# Patient Record
Sex: FEMALE | Race: WHITE | Employment: FULL TIME | URBAN - NONMETROPOLITAN AREA
[De-identification: names, ages, dates, MRNs, and addresses within clinical notes are randomized per-mention and may not be internally consistent; named-entity substitution may affect disease eponyms.]

---

## 2017-03-31 ENCOUNTER — LAB ENCOUNTER (OUTPATIENT)
Dept: LAB | Age: 52
End: 2017-03-31
Attending: FAMILY MEDICINE
Payer: COMMERCIAL

## 2017-03-31 ENCOUNTER — OFFICE VISIT (OUTPATIENT)
Dept: FAMILY MEDICINE CLINIC | Facility: CLINIC | Age: 52
End: 2017-03-31

## 2017-03-31 VITALS
DIASTOLIC BLOOD PRESSURE: 66 MMHG | HEART RATE: 91 BPM | BODY MASS INDEX: 21.53 KG/M2 | TEMPERATURE: 98 F | SYSTOLIC BLOOD PRESSURE: 106 MMHG | HEIGHT: 69 IN | OXYGEN SATURATION: 96 % | WEIGHT: 145.38 LBS

## 2017-03-31 DIAGNOSIS — R53.83 FATIGUE, UNSPECIFIED TYPE: ICD-10-CM

## 2017-03-31 DIAGNOSIS — R53.83 FATIGUE, UNSPECIFIED TYPE: Primary | ICD-10-CM

## 2017-03-31 DIAGNOSIS — F43.29 STRESS AND ADJUSTMENT REACTION: ICD-10-CM

## 2017-03-31 DIAGNOSIS — C50.919 METASTASIS FROM BREAST CANCER (HCC): ICD-10-CM

## 2017-03-31 DIAGNOSIS — Z00.00 ROUTINE HEALTH MAINTENANCE: ICD-10-CM

## 2017-03-31 DIAGNOSIS — C79.9 METASTASIS FROM BREAST CANCER (HCC): ICD-10-CM

## 2017-03-31 PROCEDURE — 36415 COLL VENOUS BLD VENIPUNCTURE: CPT

## 2017-03-31 PROCEDURE — 85025 COMPLETE CBC W/AUTO DIFF WBC: CPT

## 2017-03-31 PROCEDURE — 80053 COMPREHEN METABOLIC PANEL: CPT

## 2017-03-31 PROCEDURE — 99214 OFFICE O/P EST MOD 30 MIN: CPT | Performed by: FAMILY MEDICINE

## 2017-03-31 PROCEDURE — 80061 LIPID PANEL: CPT

## 2017-03-31 PROCEDURE — 84439 ASSAY OF FREE THYROXINE: CPT

## 2017-03-31 PROCEDURE — 84443 ASSAY THYROID STIM HORMONE: CPT

## 2017-03-31 NOTE — PROGRESS NOTES
HPI:    Patient ID: Kana Gerardo is a 46year old female. Pt c/o fatigue  Sleep ok  + stress  Concern thyroid  Wt stable  HPI    Review of Systems   Constitutional: Positive for fatigue. Negative for fever and chills. HENT: Negative.     Respiratory maintenance      Orders Placed This Encounter  TSH and Free T4 [E]  CBC W Differential W Platelet [E]  Comp Metabolic Panel (14) [E]  Lipid Panel [E]    Meds This Visit:  No prescriptions requested or ordered in this encounter    Imaging & Referrals:  None

## 2017-08-18 ENCOUNTER — PATIENT OUTREACH (OUTPATIENT)
Dept: FAMILY MEDICINE CLINIC | Facility: CLINIC | Age: 52
End: 2017-08-18

## 2017-11-03 ENCOUNTER — OFFICE VISIT (OUTPATIENT)
Dept: FAMILY MEDICINE CLINIC | Facility: CLINIC | Age: 52
End: 2017-11-03

## 2017-11-03 VITALS
BODY MASS INDEX: 21 KG/M2 | TEMPERATURE: 98 F | DIASTOLIC BLOOD PRESSURE: 70 MMHG | WEIGHT: 143.13 LBS | SYSTOLIC BLOOD PRESSURE: 130 MMHG

## 2017-11-03 DIAGNOSIS — S63.502A SPRAIN OF LEFT WRIST, INITIAL ENCOUNTER: Primary | ICD-10-CM

## 2017-11-03 DIAGNOSIS — M25.532 LEFT WRIST PAIN: ICD-10-CM

## 2017-11-03 PROCEDURE — 99214 OFFICE O/P EST MOD 30 MIN: CPT | Performed by: FAMILY MEDICINE

## 2017-11-03 PROCEDURE — L3908 WHO COCK-UP NONMOLDE PRE OTS: HCPCS | Performed by: FAMILY MEDICINE

## 2017-11-03 NOTE — PROGRESS NOTES
HPI:    Patient ID: Lizbeth Flores is a 45year old female. X 2 wks  Fell back catching on L wrist  Pain wrist / forearm  Using OK  Motion OK  ?  swelling  HPI    Review of Systems           Current Outpatient Prescriptions:  exemestane (AROMASIN) 25 MG

## 2017-11-03 NOTE — PATIENT INSTRUCTIONS
Wrist immobilizer. Alternate ice and heat. Massage. Reevaluation in 2 weeks. Call with questions or problems.

## 2018-09-14 ENCOUNTER — TELEPHONE (OUTPATIENT)
Dept: FAMILY MEDICINE CLINIC | Facility: CLINIC | Age: 53
End: 2018-09-14

## 2018-09-14 ENCOUNTER — NURSE ONLY (OUTPATIENT)
Dept: FAMILY MEDICINE CLINIC | Facility: CLINIC | Age: 53
End: 2018-09-14
Payer: COMMERCIAL

## 2018-09-14 DIAGNOSIS — R30.0 DYSURIA: Primary | ICD-10-CM

## 2018-09-14 LAB
APPEARANCE: CLEAR
MULTISTIX LOT#: NORMAL NUMERIC
PH, URINE: 6 (ref 4.5–8)
SPECIFIC GRAVITY: 1 (ref 1–1.03)
UROBILINOGEN,SEMI-QN: 0.2 MG/DL (ref 0–1.9)

## 2018-09-14 PROCEDURE — 87086 URINE CULTURE/COLONY COUNT: CPT | Performed by: FAMILY MEDICINE

## 2018-09-14 PROCEDURE — 81003 URINALYSIS AUTO W/O SCOPE: CPT | Performed by: FAMILY MEDICINE

## 2018-09-14 RX ORDER — CEPHALEXIN 500 MG/1
500 CAPSULE ORAL 3 TIMES DAILY
Qty: 15 CAPSULE | Refills: 0 | Status: SHIPPED | OUTPATIENT
Start: 2018-09-14 | End: 2018-09-19

## 2018-09-14 NOTE — TELEPHONE ENCOUNTER
Dull ache in back and side. Some burning with urination. Pt states she is fatigued. Pt denies fevers.  Pt is wanting to be seen today or can she bring in a urine sample? erik

## 2018-09-17 ENCOUNTER — TELEPHONE (OUTPATIENT)
Dept: FAMILY MEDICINE CLINIC | Facility: CLINIC | Age: 53
End: 2018-09-17

## 2018-09-17 NOTE — PROGRESS NOTES
HPI:   Lydia Herring is a 52year old female who is here for complaints of back pain. Pain is located at low back. Pain is described as dull, aching. Severity is mild. The pain radiates to no radiation of pain. Pain was precipitated by unknown.  Has ha IMMEDIATE/EXPANDER; Bilateral      Comment:  Performed by Roxanne Parks MD at [facility] MAIN OR  4/16/2014: [address];  Bilateral      Comment:  Performed by Akosua Correa MD at [address]  3/26/2015: EXCISION OF AXILLA MASS; Righ thoracic tenderness    ASSESSMENT:   1. Acute bilateral low back pain without sciatica  - back stretches  - states is much better  - if worsens to return for xray  - SED RATE, MELISSA (AUTOMATED); Future    2.  Fatigue, unspecified type  - CBC WITH DIFFE

## 2018-09-18 ENCOUNTER — OFFICE VISIT (OUTPATIENT)
Dept: FAMILY MEDICINE CLINIC | Facility: CLINIC | Age: 53
End: 2018-09-18

## 2018-09-18 ENCOUNTER — LAB ENCOUNTER (OUTPATIENT)
Dept: LAB | Age: 53
End: 2018-09-18
Attending: FAMILY MEDICINE
Payer: COMMERCIAL

## 2018-09-18 VITALS
SYSTOLIC BLOOD PRESSURE: 110 MMHG | HEART RATE: 85 BPM | BODY MASS INDEX: 23.5 KG/M2 | OXYGEN SATURATION: 96 % | TEMPERATURE: 98 F | HEIGHT: 67.5 IN | DIASTOLIC BLOOD PRESSURE: 80 MMHG | WEIGHT: 151.5 LBS

## 2018-09-18 DIAGNOSIS — R53.83 FATIGUE, UNSPECIFIED TYPE: ICD-10-CM

## 2018-09-18 DIAGNOSIS — C50.919 METASTATIC BREAST CANCER (HCC): ICD-10-CM

## 2018-09-18 DIAGNOSIS — M54.50 ACUTE BILATERAL LOW BACK PAIN WITHOUT SCIATICA: Primary | ICD-10-CM

## 2018-09-18 DIAGNOSIS — M54.50 ACUTE BILATERAL LOW BACK PAIN WITHOUT SCIATICA: ICD-10-CM

## 2018-09-18 DIAGNOSIS — R39.15 URGENCY OF URINATION: ICD-10-CM

## 2018-09-18 DIAGNOSIS — N95.2 ATROPHIC VAGINITIS: ICD-10-CM

## 2018-09-18 LAB
ALBUMIN SERPL-MCNC: 3.9 G/DL (ref 3.5–4.8)
ALBUMIN/GLOB SERPL: 1.1 {RATIO} (ref 1–2)
ALP LIVER SERPL-CCNC: 55 U/L (ref 41–108)
ALT SERPL-CCNC: 29 U/L (ref 14–54)
ANION GAP SERPL CALC-SCNC: 7 MMOL/L (ref 0–18)
AST SERPL-CCNC: 20 U/L (ref 15–41)
BASOPHILS # BLD AUTO: 0.03 X10(3) UL (ref 0–0.1)
BASOPHILS NFR BLD AUTO: 0.7 %
BILIRUB SERPL-MCNC: 0.8 MG/DL (ref 0.1–2)
BUN BLD-MCNC: 19 MG/DL (ref 8–20)
BUN/CREAT SERPL: 27.1 (ref 10–20)
CALCIUM BLD-MCNC: 9.4 MG/DL (ref 8.3–10.3)
CHLORIDE SERPL-SCNC: 106 MMOL/L (ref 101–111)
CO2 SERPL-SCNC: 26 MMOL/L (ref 22–32)
CREAT BLD-MCNC: 0.7 MG/DL (ref 0.55–1.02)
EOSINOPHIL # BLD AUTO: 0.13 X10(3) UL (ref 0–0.3)
EOSINOPHIL NFR BLD AUTO: 3 %
ERYTHROCYTE [DISTWIDTH] IN BLOOD BY AUTOMATED COUNT: 12.8 % (ref 11.5–16)
GLOBULIN PLAS-MCNC: 3.5 G/DL (ref 2.5–4)
GLUCOSE BLD-MCNC: 78 MG/DL (ref 70–99)
HCT VFR BLD AUTO: 42.6 % (ref 34–50)
HGB BLD-MCNC: 13.4 G/DL (ref 12–16)
IMMATURE GRANULOCYTE COUNT: 0.01 X10(3) UL (ref 0–1)
IMMATURE GRANULOCYTE RATIO %: 0.2 %
LYMPHOCYTES # BLD AUTO: 1.3 X10(3) UL (ref 0.9–4)
LYMPHOCYTES NFR BLD AUTO: 29.6 %
M PROTEIN MFR SERPL ELPH: 7.4 G/DL (ref 6.1–8.3)
MCH RBC QN AUTO: 27.7 PG (ref 27–33.2)
MCHC RBC AUTO-ENTMCNC: 31.5 G/DL (ref 31–37)
MCV RBC AUTO: 88.2 FL (ref 81–100)
MONOCYTES # BLD AUTO: 0.37 X10(3) UL (ref 0.1–1)
MONOCYTES NFR BLD AUTO: 8.4 %
NEUTROPHIL ABS PRELIM: 2.55 X10 (3) UL (ref 1.3–6.7)
NEUTROPHILS # BLD AUTO: 2.55 X10(3) UL (ref 1.3–6.7)
NEUTROPHILS NFR BLD AUTO: 58.1 %
OSMOLALITY SERPL CALC.SUM OF ELEC: 289 MOSM/KG (ref 275–295)
PLATELET # BLD AUTO: 186 10(3)UL (ref 150–450)
POTASSIUM SERPL-SCNC: 4.5 MMOL/L (ref 3.6–5.1)
RBC # BLD AUTO: 4.83 X10(6)UL (ref 3.8–5.1)
RED CELL DISTRIBUTION WIDTH-SD: 41.2 FL (ref 35.1–46.3)
SED RATE-ML: 8 MM/HR (ref 0–25)
SODIUM SERPL-SCNC: 139 MMOL/L (ref 136–144)
T4 FREE SERPL-MCNC: 1 NG/DL (ref 0.9–1.8)
TSI SER-ACNC: 1.57 MIU/ML (ref 0.35–5.5)
WBC # BLD AUTO: 4.4 X10(3) UL (ref 4–13)

## 2018-09-18 PROCEDURE — 80050 GENERAL HEALTH PANEL: CPT | Performed by: FAMILY MEDICINE

## 2018-09-18 PROCEDURE — 85652 RBC SED RATE AUTOMATED: CPT | Performed by: FAMILY MEDICINE

## 2018-09-18 PROCEDURE — 99214 OFFICE O/P EST MOD 30 MIN: CPT | Performed by: FAMILY MEDICINE

## 2018-09-18 PROCEDURE — 36415 COLL VENOUS BLD VENIPUNCTURE: CPT | Performed by: FAMILY MEDICINE

## 2018-09-18 PROCEDURE — 84439 ASSAY OF FREE THYROXINE: CPT | Performed by: FAMILY MEDICINE

## 2018-09-18 RX ORDER — CLOBETASOL PROPIONATE 0.5 MG/G
1 OINTMENT TOPICAL 2 TIMES DAILY
Qty: 15 G | Refills: 0 | Status: SHIPPED | OUTPATIENT
Start: 2018-09-18

## 2019-01-17 ENCOUNTER — HOSPITAL ENCOUNTER (OUTPATIENT)
Age: 54
Discharge: HOME | End: 2019-01-17
Payer: COMMERCIAL

## 2019-01-17 ENCOUNTER — APPOINTMENT (OUTPATIENT)
Dept: GENERAL RADIOLOGY | Age: 54
End: 2019-01-17
Attending: PHYSICIAN ASSISTANT
Payer: COMMERCIAL

## 2019-01-17 VITALS
OXYGEN SATURATION: 98 % | HEIGHT: 67 IN | HEART RATE: 82 BPM | DIASTOLIC BLOOD PRESSURE: 70 MMHG | BODY MASS INDEX: 22.76 KG/M2 | WEIGHT: 145 LBS | RESPIRATION RATE: 16 BRPM | TEMPERATURE: 98 F | SYSTOLIC BLOOD PRESSURE: 124 MMHG

## 2019-01-17 DIAGNOSIS — S73.101A SPRAIN OF RIGHT HIP, INITIAL ENCOUNTER: ICD-10-CM

## 2019-01-17 DIAGNOSIS — S76.301A RIGHT HAMSTRING INJURY, INITIAL ENCOUNTER: Primary | ICD-10-CM

## 2019-01-17 PROCEDURE — 73502 X-RAY EXAM HIP UNI 2-3 VIEWS: CPT | Performed by: PHYSICIAN ASSISTANT

## 2019-01-17 PROCEDURE — 99204 OFFICE O/P NEW MOD 45 MIN: CPT

## 2019-01-17 PROCEDURE — 99213 OFFICE O/P EST LOW 20 MIN: CPT

## 2019-01-17 RX ORDER — NAPROXEN 500 MG/1
500 TABLET ORAL 2 TIMES DAILY PRN
Qty: 20 TABLET | Refills: 0 | Status: SHIPPED | OUTPATIENT
Start: 2019-01-17 | End: 2019-01-24

## 2019-01-17 RX ORDER — CYCLOBENZAPRINE HCL 10 MG
10 TABLET ORAL 3 TIMES DAILY PRN
Qty: 20 TABLET | Refills: 0 | Status: SHIPPED | OUTPATIENT
Start: 2019-01-17 | End: 2019-01-24

## 2019-10-12 ENCOUNTER — OFFICE VISIT (OUTPATIENT)
Dept: FAMILY MEDICINE CLINIC | Facility: CLINIC | Age: 54
End: 2019-10-12
Payer: COMMERCIAL

## 2019-10-12 VITALS
RESPIRATION RATE: 18 BRPM | DIASTOLIC BLOOD PRESSURE: 80 MMHG | SYSTOLIC BLOOD PRESSURE: 122 MMHG | TEMPERATURE: 98 F | WEIGHT: 145 LBS | HEART RATE: 74 BPM | HEIGHT: 67 IN | OXYGEN SATURATION: 97 % | BODY MASS INDEX: 22.76 KG/M2

## 2019-10-12 DIAGNOSIS — H10.9 CONJUNCTIVITIS OF BOTH EYES, UNSPECIFIED CONJUNCTIVITIS TYPE: Primary | ICD-10-CM

## 2019-10-12 PROCEDURE — 99213 OFFICE O/P EST LOW 20 MIN: CPT | Performed by: PHYSICIAN ASSISTANT

## 2019-10-12 RX ORDER — GENTAMICIN SULFATE 3 MG/ML
SOLUTION/ DROPS OPHTHALMIC
Qty: 5 ML | Refills: 1 | Status: SHIPPED | OUTPATIENT
Start: 2019-10-12 | End: 2019-10-19

## 2019-10-12 NOTE — PROGRESS NOTES
CHIEF COMPLAINT:   Patient presents with:  Conjunctivitis: luis e eye pain , vision blurry, redness and itchy      HPI:   Ileana Ervin is a 62year old female who presents with chief complaint of  luis e eye irritation x 2 days.   Some thick d/c, no matterin • EXCISION OF AXILLA MASS Right 3/26/2015    Performed by Pebbles Christian MD at [address] [place] LEFT  04/16/14   • MASTECTOMY RIGHT  04/16/14   • OOPHORECTOMY     • ORTHOPEDIC SURG (PBP)      tendon transplant right knee   • TONSILLECTOM HENT: atraumatic, normocephalic, TMs non injected, without bulging or fluid bilaterally. Nasal mucosa pink and non inflamed. Posterior pharynx pink without lesions. NECK: supple, non tender  LUNGS: clear to auscultation bilaterally.    CARDIO: RRR without A cold, flu, or other virus can spread to your eyes. This causes a watery discharge. Your eyes may burn or itch and get red. Your eyelids may also be puffy and sore. Treatment  Most viral infections go away on their own.  Artificial tears and warm compress

## 2019-10-12 NOTE — PATIENT INSTRUCTIONS
1.  Gentamicin drops as prescribed. 2.  May use rewetting drops in between for symptoms relief. 3.  If symptoms fail to improve as anticipated, follow up with your eye doctor.        Conjunctivitis Caused by Infection     Wash hands often to help preven bedding or towels. Use a new, clean washcloth every day. Throw away eye cosmetics, especially mascara. Never use someone else's eye cosmetics.  If you use contact lenses, follow your healthcare provider's instructions on proper lens care.   Date Last Review

## 2020-02-08 NOTE — ED PROVIDER NOTES
Patient Seen in: [facility]    History   Patient presents with:  Hip Pain    Stated Complaint: Ongoing Hip Pain    HPI    Patient is a very pleasant 40-year-old female with a medical history of breast cancer, functional heart murmur MASTECTOMY LEFT  04/16/14   • MASTECTOMY RIGHT  04/16/14   • OOPHORECTOMY     • ORTHOPEDIC SURG (PBP)      tendon transplant right knee   • TONSILLECTOMY         Family history reviewed and is not pertinent to presenting problem.     Social History    Tobac Normal Gait. Extremity strength is 5/5 and equal bilaterally. Sensation is equal bilaterally.     ED Course   Labs Reviewed - No data to display       Xr Hip W Or Wo Pelvis 2 Or 3 Views, Right (cpt=73502)    Result Date: 1/17/2019  PROCEDURE:  XR HIP W OR W encounter    Disposition:  Discharge  1/17/2019  6:54 pm    Follow-up:  Bianca Christianson, [address] [address] [phone]              Medications Prescribed:  Current Discharge Medication List    START taking these medications no

## 2020-10-23 ENCOUNTER — OFFICE VISIT (OUTPATIENT)
Dept: FAMILY MEDICINE CLINIC | Facility: CLINIC | Age: 55
End: 2020-10-23
Payer: COMMERCIAL

## 2020-10-23 VITALS
SYSTOLIC BLOOD PRESSURE: 112 MMHG | HEART RATE: 80 BPM | TEMPERATURE: 99 F | RESPIRATION RATE: 16 BRPM | HEIGHT: 67.25 IN | BODY MASS INDEX: 24.88 KG/M2 | WEIGHT: 160.38 LBS | DIASTOLIC BLOOD PRESSURE: 64 MMHG

## 2020-10-23 DIAGNOSIS — T85.49XA DEFLATION OF BREAST IMPLANT, INITIAL ENCOUNTER: ICD-10-CM

## 2020-10-23 DIAGNOSIS — H61.102 LESION OF LEFT PINNA: ICD-10-CM

## 2020-10-23 DIAGNOSIS — Z85.3 HX: BREAST CANCER: ICD-10-CM

## 2020-10-23 DIAGNOSIS — Z01.419 WELL WOMAN EXAM WITH ROUTINE GYNECOLOGICAL EXAM: Primary | ICD-10-CM

## 2020-10-23 DIAGNOSIS — Z23 NEED FOR VACCINATION: ICD-10-CM

## 2020-10-23 PROCEDURE — 88175 CYTOPATH C/V AUTO FLUID REDO: CPT | Performed by: FAMILY MEDICINE

## 2020-10-23 PROCEDURE — 80050 GENERAL HEALTH PANEL: CPT | Performed by: FAMILY MEDICINE

## 2020-10-23 PROCEDURE — 90670 PCV13 VACCINE IM: CPT | Performed by: FAMILY MEDICINE

## 2020-10-23 PROCEDURE — 3008F BODY MASS INDEX DOCD: CPT | Performed by: FAMILY MEDICINE

## 2020-10-23 PROCEDURE — 90471 IMMUNIZATION ADMIN: CPT | Performed by: FAMILY MEDICINE

## 2020-10-23 PROCEDURE — 36415 COLL VENOUS BLD VENIPUNCTURE: CPT | Performed by: FAMILY MEDICINE

## 2020-10-23 PROCEDURE — 80061 LIPID PANEL: CPT | Performed by: FAMILY MEDICINE

## 2020-10-23 PROCEDURE — 17110 DESTRUCTION B9 LES UP TO 14: CPT | Performed by: FAMILY MEDICINE

## 2020-10-23 PROCEDURE — 90472 IMMUNIZATION ADMIN EACH ADD: CPT | Performed by: FAMILY MEDICINE

## 2020-10-23 PROCEDURE — 90715 TDAP VACCINE 7 YRS/> IM: CPT | Performed by: FAMILY MEDICINE

## 2020-10-23 PROCEDURE — 3078F DIAST BP <80 MM HG: CPT | Performed by: FAMILY MEDICINE

## 2020-10-23 PROCEDURE — 99396 PREV VISIT EST AGE 40-64: CPT | Performed by: FAMILY MEDICINE

## 2020-10-23 PROCEDURE — 83036 HEMOGLOBIN GLYCOSYLATED A1C: CPT | Performed by: FAMILY MEDICINE

## 2020-10-23 PROCEDURE — 87624 HPV HI-RISK TYP POOLED RSLT: CPT | Performed by: FAMILY MEDICINE

## 2020-10-23 PROCEDURE — 3074F SYST BP LT 130 MM HG: CPT | Performed by: FAMILY MEDICINE

## 2020-10-23 PROCEDURE — 90686 IIV4 VACC NO PRSV 0.5 ML IM: CPT | Performed by: FAMILY MEDICINE

## 2020-10-23 RX ORDER — BACILLUS COAGULANS/INULIN 1B-250 MG
CAPSULE ORAL DAILY
COMMUNITY
End: 2021-03-23

## 2020-10-23 RX ORDER — CHLORAL HYDRATE 500 MG
CAPSULE ORAL DAILY
COMMUNITY

## 2020-10-23 NOTE — H&P
HPI:   Gian Rodrigez is a 60year old female who presents for a complete physical exam. Symptoms: is menopausal. Patient complains of needing physical. Needs pap. Lesion on left ear on off 3 months. Will crust over  And fall off.   Had ovariies removed • Coenzyme Q10 (CO Q 10) 100 MG Oral Cap Take  by mouth. • Multiple Vitamins-Minerals (MULTI-VITAMIN/MINERALS) Oral Tab Take 1 tablet by mouth daily. • B Complex Vitamins (B COMPLEX OR) Take  by mouth daily.      • Vitamin C (VITAMIN C) 500 MG Ora Alcohol/week: 0.0 standard drinks      Comment: occ    Drug use: No      Comment: No HIV high risk behavior    Occ: [facility] [employer]. : . Children: 2. Exercise: walking.   Diet: low carb diet     REVIEW OF SYSTEMS:   GENERAL: female who presents for a complete physical exam.    1. Well woman exam with routine gynecological exam  - anticitpatory care discussed  - pap done  - walk 10,000 steps. - healthy food choices.   - IMMUNIZATION ADMINISTRATION  - EACH ADDITIONAL VACCINE  -

## 2021-02-22 ENCOUNTER — MED REC SCAN ONLY (OUTPATIENT)
Dept: FAMILY MEDICINE CLINIC | Facility: CLINIC | Age: 56
End: 2021-02-22

## 2021-03-16 ENCOUNTER — OFFICE VISIT (OUTPATIENT)
Dept: SURGERY | Facility: CLINIC | Age: 56
End: 2021-03-16
Payer: COMMERCIAL

## 2021-03-16 VITALS
BODY MASS INDEX: 25 KG/M2 | TEMPERATURE: 97 F | HEART RATE: 87 BPM | OXYGEN SATURATION: 97 % | WEIGHT: 163.38 LBS | SYSTOLIC BLOOD PRESSURE: 147 MMHG | DIASTOLIC BLOOD PRESSURE: 81 MMHG | RESPIRATION RATE: 18 BRPM

## 2021-03-16 DIAGNOSIS — Z90.13 ABSENCE OF BREAST, ACQUIRED, BILATERAL: Primary | ICD-10-CM

## 2021-03-16 PROCEDURE — 99243 OFF/OP CNSLTJ NEW/EST LOW 30: CPT | Performed by: SURGERY

## 2021-03-16 PROCEDURE — 3079F DIAST BP 80-89 MM HG: CPT | Performed by: SURGERY

## 2021-03-16 PROCEDURE — 3077F SYST BP >= 140 MM HG: CPT | Performed by: SURGERY

## 2021-03-16 NOTE — CONSULTS
New Patient Consultation    This is the first visit for this 60year old female referred for evaluation of her implant-based breast reconstruction. History of Present Illness:    The patient is a 60year old female with a history of right breast cancer w • TONSILLECTOMY           Medications:    Magnesium-Potassium 250-100 MG Oral Tab, Take by mouth daily. , Disp: , Rfl:   omega-3 fatty acids 1000 MG Oral Cap, Take by mouth daily. , Disp: , Rfl:   B Complex Vitamins (B COMPLEX OR), Take  by mouth daily. , D Drug use: No   Comment: No HIV high risk behavior           Review of Systems:    General:   The patient denies, fever, chills, night sweats, fatigue, generalized weakness, change in appetite, weight loss, or +weight gain. Endocrine:    There is +painful menses, or pregnancy    Musculoskeletal:  The patient denies muscle aches/pain, +joint pain, stiff joints, neck pain, back pain or bone pain.     Neurologic:  There is no history of migraines or severe headaches, seizure/epilepsy, speech problems, palpable lymphadenopathy. The left axilla is noted to have a subtle subcutaneous fullness. Abdomen: Well-healed laparoscopic port sites are noted. Moderate central abdominal and flank lipodystrophy is noted. There are no palpable hernias noted.     Andres Bryant satisfaction. No guarantees as to outcome were offered. The patient expresses understanding and wishes to proceed.

## 2021-03-16 NOTE — PATIENT INSTRUCTIONS
Surgeon:         Dr. Brenda Skiff                                        Tel:        [phone]                                  Fax:        [phone]    Surgery/Procedure:    Bilateral breast implant exchange, autologous fat grafting to the bilateral

## 2021-03-17 ENCOUNTER — TELEPHONE (OUTPATIENT)
Dept: SURGERY | Facility: CLINIC | Age: 56
End: 2021-03-17

## 2021-03-17 NOTE — TELEPHONE ENCOUNTER
Future Appointments   Date Time Provider Jake Elaine   3/23/2021  9:00 AM BREAST CLINIC [facility]

## 2021-03-19 ENCOUNTER — TELEPHONE (OUTPATIENT)
Dept: SURGERY | Facility: CLINIC | Age: 56
End: 2021-03-19

## 2021-03-19 NOTE — TELEPHONE ENCOUNTER
I called the patient and left a voice mail requesting a return call to schedule her surgery with Dr Will Burgos

## 2021-03-23 ENCOUNTER — OFFICE VISIT (OUTPATIENT)
Dept: SURGERY | Facility: CLINIC | Age: 56
End: 2021-03-23
Payer: COMMERCIAL

## 2021-03-23 ENCOUNTER — OFFICE VISIT (OUTPATIENT)
Dept: HEMATOLOGY/ONCOLOGY | Facility: HOSPITAL | Age: 56
End: 2021-03-23
Attending: SURGERY
Payer: COMMERCIAL

## 2021-03-23 VITALS
HEART RATE: 75 BPM | OXYGEN SATURATION: 98 % | SYSTOLIC BLOOD PRESSURE: 145 MMHG | WEIGHT: 160 LBS | TEMPERATURE: 98 F | RESPIRATION RATE: 16 BRPM | HEIGHT: 67.24 IN | DIASTOLIC BLOOD PRESSURE: 80 MMHG | BODY MASS INDEX: 24.82 KG/M2

## 2021-03-23 DIAGNOSIS — Z90.13 ABSENCE OF BREAST, ACQUIRED, BILATERAL: Primary | ICD-10-CM

## 2021-03-23 DIAGNOSIS — Z85.3 HISTORY OF RIGHT BREAST CANCER: ICD-10-CM

## 2021-03-23 DIAGNOSIS — R22.32 AXILLARY MASS, LEFT: Primary | ICD-10-CM

## 2021-03-23 PROCEDURE — 99243 OFF/OP CNSLTJ NEW/EST LOW 30: CPT | Performed by: SURGERY

## 2021-03-23 PROCEDURE — 99211 OFF/OP EST MAY X REQ PHY/QHP: CPT

## 2021-03-23 NOTE — TELEPHONE ENCOUNTER
I called the patient and scheduled her surgery with Dr Landon Grubbs on 08/11/2021 at [facility]. Confirmed date and location. She stated she is following up with dr Shelagh Bernheim with an [address] [facility] [facility] and if she needs to change her surgery date she will contact us.

## 2021-03-23 NOTE — PROGRESS NOTES
Breast clinic f/u with Dr. Honorio Norris. Pt here to have left axilla fullness evaluated prior to possible RADHA breast implant exchange with Dr. Isiah Hawkins.      Education Record    Learner:  Patient    Disease / Diagnosis: breast ca    Barriers / Limitations:  None

## 2021-03-24 DIAGNOSIS — Z90.13 ABSENCE OF BREAST, ACQUIRED, BILATERAL: Primary | ICD-10-CM

## 2021-03-24 NOTE — H&P
New Patient Visit Note       Active Problems      1. Axillary mass, left    2.  History of right breast cancer        Chief Complaint   Left axillary mass    History of Present Illness   The patient is a 68-year-old female who underwent bilateral mastectomy Performed by Pebbles Christian MD at [address] [facility] LEFT  04/16/14   • MASTECTOMY RIGHT  04/16/14   • OOPHORECTOMY     • ORTHOPEDIC SURG (PBP)      tendon transplant right knee   • TONSILLECTOMY         The family history and social hi Social Gatherings with Friends and Family:       Attends Gnosticist Services:       Active Member of Clubs or Organizations:       Attends Club or Organization Meetings:       Marital Status:   Intimate Partner Violence:       Fear of Current or Ex-Partner: No mass. Breasts: Breasts are symmetrical.         Right: Inverted nipple present. No mass, nipple discharge, skin change or tenderness. Left: No inverted nipple, mass, nipple discharge, skin change or tenderness.       Comments: Bilateral mast

## 2021-03-27 ENCOUNTER — HOSPITAL ENCOUNTER (OUTPATIENT)
Dept: ULTRASOUND IMAGING | Age: 56
Discharge: HOME | End: 2021-03-27
Attending: SURGERY
Payer: COMMERCIAL

## 2021-03-27 DIAGNOSIS — Z85.3 HISTORY OF RIGHT BREAST CANCER: ICD-10-CM

## 2021-03-27 DIAGNOSIS — R22.32 AXILLARY MASS, LEFT: ICD-10-CM

## 2021-03-27 PROCEDURE — 76882 US LMTD JT/FCL EVL NVASC XTR: CPT | Performed by: SURGERY

## 2021-04-05 ENCOUNTER — TELEPHONE (OUTPATIENT)
Dept: SURGERY | Facility: CLINIC | Age: 56
End: 2021-04-05

## 2021-04-05 NOTE — TELEPHONE ENCOUNTER
----- Message from Mya Lizarraga MD sent at 4/5/2021  9:10 AM CDT -----  Please put in the recall system for an exam in July 2021.     ThanksCHRISTIANE

## 2021-05-19 ENCOUNTER — TELEPHONE (OUTPATIENT)
Dept: FAMILY MEDICINE CLINIC | Facility: CLINIC | Age: 56
End: 2021-05-19

## 2021-05-19 DIAGNOSIS — R73.09 ELEVATED GLUCOSE: Primary | ICD-10-CM

## 2021-05-19 NOTE — TELEPHONE ENCOUNTER
Talked with Velasquez Lucero and explained that she will have labs and EKG as well as H & P for pre-op exam. She is asking if she can also have an A1C done.  I told her she can have that drawn at that time as well however she is asking if that can be done now as her la

## 2021-05-19 NOTE — TELEPHONE ENCOUNTER
Left msg on voicemail that order for A1C has been placed and patient can call and schedule lab appt.

## 2021-05-29 ENCOUNTER — NURSE ONLY (OUTPATIENT)
Dept: FAMILY MEDICINE CLINIC | Facility: CLINIC | Age: 56
End: 2021-05-29
Payer: COMMERCIAL

## 2021-05-29 DIAGNOSIS — R73.09 ELEVATED GLUCOSE: Primary | ICD-10-CM

## 2021-05-29 PROCEDURE — 83036 HEMOGLOBIN GLYCOSYLATED A1C: CPT | Performed by: FAMILY MEDICINE

## 2021-05-29 NOTE — PROGRESS NOTES
HPI/Subjective:   Patient ID: Aby Denis is a 60year old female. HPI    History/Other:   Review of Systems  Current Outpatient Medications   Medication Sig Dispense Refill   • Magnesium-Potassium 250-100 MG Oral Tab Take by mouth daily.      • om

## 2021-05-29 NOTE — PROGRESS NOTES
Patient here for venipuncture tolerated well told patient that results will be back next week that we aren't here Monday. Patient had no questions .

## 2021-08-02 ENCOUNTER — TELEPHONE (OUTPATIENT)
Dept: SURGERY | Facility: CLINIC | Age: 56
End: 2021-08-02

## 2021-08-02 NOTE — TELEPHONE ENCOUNTER
I called and spoke to the patient. Patient needs to cancel her surgery due to her moving her elderly mother. Patient state she will call back to reschedule her surgery once she has a better idea. Duane Musca and MD notified.

## 2022-03-18 ENCOUNTER — TELEPHONE (OUTPATIENT)
Dept: FAMILY MEDICINE CLINIC | Facility: CLINIC | Age: 57
End: 2022-03-18

## 2022-03-18 NOTE — TELEPHONE ENCOUNTER
Patient states tested positive for covid last night. C/o occasional productive cough, body aches, fatigue. Felt feverish, but has not taken temp. Taking ibuprofen and tylenol for aches. Symptoms started 4 days. Has received vaccines but not booster. Is feeling better today. Patient is asking for a medication for covid that she saw on TV. Advised that anti-viral medication may be given for persons at high risk for hospitalization or already hospitalized but will ask Dr. Avni So' recommendations and call patient back.

## 2022-03-18 NOTE — TELEPHONE ENCOUNTER
Advised Rafael that Gopal Mahoney has come out with an oral antiviral medication however she would not meet the criteria for prescribing. Advised tylenol, fluids, vitamins, rest. To ER if any SOB. Patient verbalized understanding.

## 2022-03-18 NOTE — TELEPHONE ENCOUNTER
PATIENT HAS COVID  SHE IS ASKING ABOUT A NEW MEDICATION SHE SEEN ON TV THAT [employer] HAS.  IS IT AVAILABLE/ SANDY LOWE

## 2022-05-09 ENCOUNTER — LABORATORY ENCOUNTER (OUTPATIENT)
Dept: LAB | Age: 57
End: 2022-05-09
Attending: FAMILY MEDICINE
Payer: COMMERCIAL

## 2022-05-09 ENCOUNTER — OFFICE VISIT (OUTPATIENT)
Dept: FAMILY MEDICINE CLINIC | Facility: CLINIC | Age: 57
End: 2022-05-09
Payer: COMMERCIAL

## 2022-05-09 VITALS
WEIGHT: 157 LBS | DIASTOLIC BLOOD PRESSURE: 72 MMHG | HEART RATE: 90 BPM | BODY MASS INDEX: 24.64 KG/M2 | TEMPERATURE: 98 F | SYSTOLIC BLOOD PRESSURE: 124 MMHG | HEIGHT: 67 IN | OXYGEN SATURATION: 98 %

## 2022-05-09 DIAGNOSIS — R25.2 LEG CRAMP: ICD-10-CM

## 2022-05-09 DIAGNOSIS — Z12.11 COLON CANCER SCREENING: ICD-10-CM

## 2022-05-09 DIAGNOSIS — C50.919 BREAST CANCER (HCC): Primary | ICD-10-CM

## 2022-05-09 DIAGNOSIS — Z85.3 HX: BREAST CANCER: ICD-10-CM

## 2022-05-09 DIAGNOSIS — L57.0 ACTINIC KERATOSES: ICD-10-CM

## 2022-05-09 DIAGNOSIS — Z01.419 WELL WOMAN EXAM WITH ROUTINE GYNECOLOGICAL EXAM: Primary | ICD-10-CM

## 2022-05-09 DIAGNOSIS — Z01.419 WELL WOMAN EXAM WITH ROUTINE GYNECOLOGICAL EXAM: ICD-10-CM

## 2022-05-09 DIAGNOSIS — T85.49XD DEFLATION OF BREAST IMPLANT, SUBSEQUENT ENCOUNTER: ICD-10-CM

## 2022-05-09 LAB
ALBUMIN SERPL-MCNC: 4 G/DL (ref 3.4–5)
ALBUMIN/GLOB SERPL: 1.3 {RATIO} (ref 1–2)
ALP LIVER SERPL-CCNC: 67 U/L
ALT SERPL-CCNC: 23 U/L
ANION GAP SERPL CALC-SCNC: 8 MMOL/L (ref 0–18)
AST SERPL-CCNC: 16 U/L (ref 15–37)
BASOPHILS # BLD AUTO: 0.03 X10(3) UL (ref 0–0.2)
BASOPHILS NFR BLD AUTO: 0.6 %
BILIRUB SERPL-MCNC: 0.9 MG/DL (ref 0.1–2)
BUN BLD-MCNC: 16 MG/DL (ref 7–18)
CALCIUM BLD-MCNC: 9.5 MG/DL (ref 8.5–10.1)
CHLORIDE SERPL-SCNC: 107 MMOL/L (ref 98–112)
CHOLEST SERPL-MCNC: 256 MG/DL (ref ?–200)
CO2 SERPL-SCNC: 27 MMOL/L (ref 21–32)
CREAT BLD-MCNC: 0.79 MG/DL
EOSINOPHIL # BLD AUTO: 0.15 X10(3) UL (ref 0–0.7)
EOSINOPHIL NFR BLD AUTO: 3.1 %
ERYTHROCYTE [DISTWIDTH] IN BLOOD BY AUTOMATED COUNT: 13.2 %
EST. AVERAGE GLUCOSE BLD GHB EST-MCNC: 120 MG/DL (ref 68–126)
FASTING PATIENT LIPID ANSWER: YES
FASTING STATUS PATIENT QL REPORTED: YES
GLOBULIN PLAS-MCNC: 3.2 G/DL (ref 2.8–4.4)
GLUCOSE BLD-MCNC: 98 MG/DL (ref 70–99)
HBA1C MFR BLD: 5.8 % (ref ?–5.7)
HCT VFR BLD AUTO: 43.8 %
HDLC SERPL-MCNC: 81 MG/DL (ref 40–59)
HGB BLD-MCNC: 13.9 G/DL
IMM GRANULOCYTES # BLD AUTO: 0.01 X10(3) UL (ref 0–1)
IMM GRANULOCYTES NFR BLD: 0.2 %
LDLC SERPL CALC-MCNC: 156 MG/DL (ref ?–100)
LYMPHOCYTES # BLD AUTO: 1.5 X10(3) UL (ref 1–4)
LYMPHOCYTES NFR BLD AUTO: 30.7 %
MCH RBC QN AUTO: 28.1 PG (ref 26–34)
MCHC RBC AUTO-ENTMCNC: 31.7 G/DL (ref 31–37)
MCV RBC AUTO: 88.7 FL
MONOCYTES # BLD AUTO: 0.42 X10(3) UL (ref 0.1–1)
MONOCYTES NFR BLD AUTO: 8.6 %
NEUTROPHILS # BLD AUTO: 2.78 X10 (3) UL (ref 1.5–7.7)
NEUTROPHILS # BLD AUTO: 2.78 X10(3) UL (ref 1.5–7.7)
NEUTROPHILS NFR BLD AUTO: 56.8 %
NONHDLC SERPL-MCNC: 175 MG/DL (ref ?–130)
OSMOLALITY SERPL CALC.SUM OF ELEC: 295 MOSM/KG (ref 275–295)
PLATELET # BLD AUTO: 214 10(3)UL (ref 150–450)
POTASSIUM SERPL-SCNC: 4.5 MMOL/L (ref 3.5–5.1)
PROT SERPL-MCNC: 7.2 G/DL (ref 6.4–8.2)
RBC # BLD AUTO: 4.94 X10(6)UL
SODIUM SERPL-SCNC: 142 MMOL/L (ref 136–145)
TRIGL SERPL-MCNC: 113 MG/DL (ref 30–149)
TSI SER-ACNC: 1.74 MIU/ML (ref 0.36–3.74)
VIT B12 SERPL-MCNC: 421 PG/ML (ref 193–986)
VIT D+METAB SERPL-MCNC: 35.3 NG/ML (ref 30–100)
VLDLC SERPL CALC-MCNC: 22 MG/DL (ref 0–30)
WBC # BLD AUTO: 4.9 X10(3) UL (ref 4–11)

## 2022-05-09 PROCEDURE — 99396 PREV VISIT EST AGE 40-64: CPT | Performed by: FAMILY MEDICINE

## 2022-05-09 PROCEDURE — 3074F SYST BP LT 130 MM HG: CPT | Performed by: FAMILY MEDICINE

## 2022-05-09 PROCEDURE — 80061 LIPID PANEL: CPT | Performed by: FAMILY MEDICINE

## 2022-05-09 PROCEDURE — 3078F DIAST BP <80 MM HG: CPT | Performed by: FAMILY MEDICINE

## 2022-05-09 PROCEDURE — 3008F BODY MASS INDEX DOCD: CPT | Performed by: FAMILY MEDICINE

## 2022-05-09 PROCEDURE — 82607 VITAMIN B-12: CPT | Performed by: FAMILY MEDICINE

## 2022-05-09 PROCEDURE — 83036 HEMOGLOBIN GLYCOSYLATED A1C: CPT | Performed by: FAMILY MEDICINE

## 2022-05-09 PROCEDURE — 80050 GENERAL HEALTH PANEL: CPT | Performed by: FAMILY MEDICINE

## 2022-05-09 PROCEDURE — 82306 VITAMIN D 25 HYDROXY: CPT | Performed by: FAMILY MEDICINE

## 2022-10-06 ENCOUNTER — OFFICE VISIT (OUTPATIENT)
Dept: FAMILY MEDICINE CLINIC | Facility: CLINIC | Age: 57
End: 2022-10-06
Payer: COMMERCIAL

## 2022-10-06 ENCOUNTER — TELEPHONE (OUTPATIENT)
Dept: FAMILY MEDICINE CLINIC | Facility: CLINIC | Age: 57
End: 2022-10-06

## 2022-10-06 VITALS
BODY MASS INDEX: 25 KG/M2 | WEIGHT: 158 LBS | OXYGEN SATURATION: 98 % | TEMPERATURE: 98 F | DIASTOLIC BLOOD PRESSURE: 69 MMHG | SYSTOLIC BLOOD PRESSURE: 127 MMHG | HEART RATE: 72 BPM | RESPIRATION RATE: 18 BRPM

## 2022-10-06 DIAGNOSIS — R39.9 UTI SYMPTOMS: Primary | ICD-10-CM

## 2022-10-06 LAB
BILIRUBIN: NEGATIVE
GLUCOSE (URINE DIPSTICK): NEGATIVE MG/DL
KETONES (URINE DIPSTICK): NEGATIVE MG/DL
MULTISTIX LOT#: ABNORMAL NUMERIC
NITRITE, URINE: NEGATIVE
OCCULT BLOOD: NEGATIVE
PH, URINE: 6.5 (ref 4.5–8)
PROTEIN (URINE DIPSTICK): NEGATIVE MG/DL
SPECIFIC GRAVITY: 1.02 (ref 1–1.03)
URINE-COLOR: YELLOW
UROBILINOGEN,SEMI-QN: 0.2 MG/DL (ref 0–1.9)

## 2022-10-06 PROCEDURE — 3078F DIAST BP <80 MM HG: CPT | Performed by: NURSE PRACTITIONER

## 2022-10-06 PROCEDURE — 87086 URINE CULTURE/COLONY COUNT: CPT | Performed by: NURSE PRACTITIONER

## 2022-10-06 PROCEDURE — 3074F SYST BP LT 130 MM HG: CPT | Performed by: NURSE PRACTITIONER

## 2022-10-06 PROCEDURE — 81003 URINALYSIS AUTO W/O SCOPE: CPT | Performed by: NURSE PRACTITIONER

## 2022-10-06 PROCEDURE — 99213 OFFICE O/P EST LOW 20 MIN: CPT | Performed by: NURSE PRACTITIONER

## 2022-10-06 RX ORDER — CEPHALEXIN 500 MG/1
500 CAPSULE ORAL 2 TIMES DAILY
Qty: 14 CAPSULE | Refills: 0 | Status: SHIPPED | OUTPATIENT
Start: 2022-10-06 | End: 2022-10-13

## 2022-10-06 NOTE — TELEPHONE ENCOUNTER
Patient is in [place], and unable to be back for nurse appt for today. Discussed situation and she will go to [facility] in [place]. fyi to Dr Tafoya Comes.

## 2022-10-06 NOTE — TELEPHONE ENCOUNTER
Pt. Thinks she has a UTI, Pain and urgency and just not feeling well. A little bit of back pain. She wanted appt. No openings.

## 2023-02-01 ENCOUNTER — OFFICE VISIT (OUTPATIENT)
Dept: FAMILY MEDICINE CLINIC | Facility: CLINIC | Age: 58
End: 2023-02-01
Payer: COMMERCIAL

## 2023-02-01 VITALS
HEART RATE: 86 BPM | TEMPERATURE: 98 F | SYSTOLIC BLOOD PRESSURE: 124 MMHG | BODY MASS INDEX: 25 KG/M2 | RESPIRATION RATE: 98 BRPM | DIASTOLIC BLOOD PRESSURE: 68 MMHG | WEIGHT: 161.5 LBS

## 2023-02-01 DIAGNOSIS — Z91.018 MULTIPLE FOOD ALLERGIES: ICD-10-CM

## 2023-02-01 DIAGNOSIS — L29.9 PRURITIC CONDITION: Primary | ICD-10-CM

## 2023-02-01 LAB
ALBUMIN SERPL-MCNC: 4.4 G/DL (ref 3.4–5)
ALBUMIN/GLOB SERPL: 1.2 {RATIO} (ref 1–2)
ALP LIVER SERPL-CCNC: 73 U/L
ALT SERPL-CCNC: 26 U/L
ANION GAP SERPL CALC-SCNC: 1 MMOL/L (ref 0–18)
AST SERPL-CCNC: 23 U/L (ref 15–37)
BASOPHILS # BLD AUTO: 0.03 X10(3) UL (ref 0–0.2)
BASOPHILS NFR BLD AUTO: 0.5 %
BILIRUB SERPL-MCNC: 0.7 MG/DL (ref 0.1–2)
BUN BLD-MCNC: 20 MG/DL (ref 7–18)
CALCIUM BLD-MCNC: 9.8 MG/DL (ref 8.5–10.1)
CHLORIDE SERPL-SCNC: 107 MMOL/L (ref 98–112)
CO2 SERPL-SCNC: 28 MMOL/L (ref 21–32)
CREAT BLD-MCNC: 0.86 MG/DL
EOSINOPHIL # BLD AUTO: 0.08 X10(3) UL (ref 0–0.7)
EOSINOPHIL NFR BLD AUTO: 1.4 %
ERYTHROCYTE [DISTWIDTH] IN BLOOD BY AUTOMATED COUNT: 13.2 %
FASTING STATUS PATIENT QL REPORTED: NO
GFR SERPLBLD BASED ON 1.73 SQ M-ARVRAT: 78 ML/MIN/1.73M2 (ref 60–?)
GLOBULIN PLAS-MCNC: 3.7 G/DL (ref 2.8–4.4)
GLUCOSE BLD-MCNC: 105 MG/DL (ref 70–99)
HCT VFR BLD AUTO: 43.5 %
HGB BLD-MCNC: 14 G/DL
IMM GRANULOCYTES # BLD AUTO: 0.02 X10(3) UL (ref 0–1)
IMM GRANULOCYTES NFR BLD: 0.4 %
LYMPHOCYTES # BLD AUTO: 1.49 X10(3) UL (ref 1–4)
LYMPHOCYTES NFR BLD AUTO: 26.7 %
MCH RBC QN AUTO: 27.8 PG (ref 26–34)
MCHC RBC AUTO-ENTMCNC: 32.2 G/DL (ref 31–37)
MCV RBC AUTO: 86.3 FL
MONOCYTES # BLD AUTO: 0.4 X10(3) UL (ref 0.1–1)
MONOCYTES NFR BLD AUTO: 7.2 %
NEUTROPHILS # BLD AUTO: 3.57 X10 (3) UL (ref 1.5–7.7)
NEUTROPHILS # BLD AUTO: 3.57 X10(3) UL (ref 1.5–7.7)
NEUTROPHILS NFR BLD AUTO: 63.8 %
OSMOLALITY SERPL CALC.SUM OF ELEC: 285 MOSM/KG (ref 275–295)
PLATELET # BLD AUTO: 256 10(3)UL (ref 150–450)
POTASSIUM SERPL-SCNC: 4.4 MMOL/L (ref 3.5–5.1)
PROT SERPL-MCNC: 8.1 G/DL (ref 6.4–8.2)
RBC # BLD AUTO: 5.04 X10(6)UL
SODIUM SERPL-SCNC: 136 MMOL/L (ref 136–145)
T4 FREE SERPL-MCNC: 1.2 NG/DL (ref 0.8–1.7)
TSI SER-ACNC: 2.22 MIU/ML (ref 0.36–3.74)
WBC # BLD AUTO: 5.6 X10(3) UL (ref 4–11)

## 2023-02-01 PROCEDURE — 83036 HEMOGLOBIN GLYCOSYLATED A1C: CPT | Performed by: FAMILY MEDICINE

## 2023-02-01 PROCEDURE — 84439 ASSAY OF FREE THYROXINE: CPT | Performed by: FAMILY MEDICINE

## 2023-02-01 PROCEDURE — 86003 ALLG SPEC IGE CRUDE XTRC EA: CPT | Performed by: FAMILY MEDICINE

## 2023-02-01 PROCEDURE — 3078F DIAST BP <80 MM HG: CPT | Performed by: FAMILY MEDICINE

## 2023-02-01 PROCEDURE — 3074F SYST BP LT 130 MM HG: CPT | Performed by: FAMILY MEDICINE

## 2023-02-01 PROCEDURE — 80050 GENERAL HEALTH PANEL: CPT | Performed by: FAMILY MEDICINE

## 2023-02-01 PROCEDURE — 99214 OFFICE O/P EST MOD 30 MIN: CPT | Performed by: FAMILY MEDICINE

## 2023-02-01 PROCEDURE — 82785 ASSAY OF IGE: CPT | Performed by: FAMILY MEDICINE

## 2023-02-02 LAB
EST. AVERAGE GLUCOSE BLD GHB EST-MCNC: 126 MG/DL (ref 68–126)
HBA1C MFR BLD: 6 % (ref ?–5.7)

## 2023-02-03 LAB
A ALTERNATA IGE QN: <0.1 KUA/L (ref ?–0.1)
A FUMIGATUS IGE QN: <0.1 KUA/L (ref ?–0.1)
AMER SYCAMORE IGE QN: <0.1 KUA/L (ref ?–0.1)
BERMUDA GRASS IGE QN: <0.1 KUA/L (ref ?–0.1)
BOXELDER IGE QN: <0.1 KUA/L (ref ?–0.1)
C HERBARUM IGE QN: <0.1 KUA/L (ref ?–0.1)
CALIF WALNUT IGE QN: <0.1 KUA/L (ref ?–0.1)
CAT DANDER IGE QN: <0.1 KUA/L (ref ?–0.1)
CLAM IGE QN: <0.1 KUA/L (ref ?–0.1)
CMN PIGWEED IGE QN: <0.1 KUA/L (ref ?–0.1)
CODFISH IGE QN: <0.1 KUA/L (ref ?–0.1)
COMMON RAGWEED IGE QN: <0.1 KUA/L (ref ?–0.1)
CORN IGE QN: <0.1 KUA/L (ref ?–0.1)
COTTONWOOD IGE QN: <0.1 KUA/L (ref ?–0.1)
COW MILK IGE QN: <0.1 KUA/L (ref ?–0.1)
D FARINAE IGE QN: <0.1 KUA/L (ref ?–0.1)
D PTERONYSS IGE QN: <0.1 KUA/L (ref ?–0.1)
DOG DANDER IGE QN: <0.1 KUA/L (ref ?–0.1)
EGG WHITE IGE QN: <0.1 KUA/L (ref ?–0.1)
IGE SERPL-ACNC: 53.8 KU/L (ref 2–214)
IGE SERPL-ACNC: 54.4 KU/L (ref 2–214)
M RACEMOSUS IGE QN: <0.1 KUA/L (ref ?–0.1)
MARSH ELDER IGE QN: <0.1 KUA/L (ref ?–0.1)
MOUSE EPITH IGE QN: <0.1 KUA/L (ref ?–0.1)
MT JUNIPER IGE QN: <0.1 KUA/L (ref ?–0.1)
P NOTATUM IGE QN: <0.1 KUA/L (ref ?–0.1)
PEANUT IGE QN: <0.1 KUA/L (ref ?–0.1)
PECAN/HICK TREE IGE QN: <0.1 KUA/L (ref ?–0.1)
ROACH IGE QN: <0.1 KUA/L (ref ?–0.1)
SALTWORT IGE QN: <0.1 KUA/L (ref ?–0.1)
SCALLOP IGE QN: <0.1 KUA/L (ref ?–0.1)
SESAME SEED IGE QN: <0.1 KUA/L (ref ?–0.1)
SHRIMP IGE QN: <0.1 KUA/L (ref ?–0.1)
SOYBEAN IGE QN: <0.1 KUA/L (ref ?–0.1)
TIMOTHY IGE QN: <0.1 KUA/L (ref ?–0.1)
WALNUT IGE QN: <0.1 KUA/L (ref ?–0.1)
WHEAT IGE QN: 0.15 KUA/L (ref ?–0.1)
WHITE ASH IGE QN: <0.1 KUA/L (ref ?–0.1)
WHITE ELM IGE QN: 0.19 KUA/L (ref ?–0.1)
WHITE MULBERRY IGE QN: <0.1 KUA/L (ref ?–0.1)
WHITE OAK IGE QN: <0.1 KUA/L (ref ?–0.1)

## 2023-03-21 ENCOUNTER — OFFICE VISIT (OUTPATIENT)
Dept: SURGERY | Facility: CLINIC | Age: 58
End: 2023-03-21
Payer: COMMERCIAL

## 2023-03-21 DIAGNOSIS — Z98.890 H/O BREAST RECONSTRUCTION: Primary | ICD-10-CM

## 2023-03-21 PROCEDURE — 99212 OFFICE O/P EST SF 10 MIN: CPT | Performed by: SURGERY

## 2023-03-28 ENCOUNTER — TELEPHONE (OUTPATIENT)
Dept: SURGERY | Facility: CLINIC | Age: 58
End: 2023-03-28

## 2023-03-28 DIAGNOSIS — Z98.890 H/O BREAST RECONSTRUCTION: Primary | ICD-10-CM

## 2023-03-28 NOTE — TELEPHONE ENCOUNTER
Calling pt in regards to scheduling surgery. Informed pt that I have 07/31/2023 available at [facility] with Dr. Maximus Raya. Pt verbalized understanding and in agreement with date and location. All questions answered. Encouraged pt to call or [facility] message office with any other questions or concerns.

## 2023-04-04 ENCOUNTER — TELEPHONE (OUTPATIENT)
Dept: FAMILY MEDICINE CLINIC | Facility: CLINIC | Age: 58
End: 2023-04-04

## 2023-07-05 ENCOUNTER — LABORATORY ENCOUNTER (OUTPATIENT)
Dept: LAB | Age: 58
End: 2023-07-05
Attending: FAMILY MEDICINE
Payer: COMMERCIAL

## 2023-07-05 ENCOUNTER — OFFICE VISIT (OUTPATIENT)
Dept: FAMILY MEDICINE CLINIC | Facility: CLINIC | Age: 58
End: 2023-07-05
Payer: COMMERCIAL

## 2023-07-05 VITALS
BODY MASS INDEX: 25.74 KG/M2 | DIASTOLIC BLOOD PRESSURE: 70 MMHG | TEMPERATURE: 97 F | SYSTOLIC BLOOD PRESSURE: 118 MMHG | HEART RATE: 77 BPM | RESPIRATION RATE: 16 BRPM | WEIGHT: 164 LBS | HEIGHT: 67 IN | OXYGEN SATURATION: 97 %

## 2023-07-05 DIAGNOSIS — Z91.018 MULTIPLE FOOD ALLERGIES: ICD-10-CM

## 2023-07-05 DIAGNOSIS — Z01.818 PRE-OP EXAMINATION: ICD-10-CM

## 2023-07-05 DIAGNOSIS — Z78.0 POSTMENOPAUSAL ESTROGEN DEFICIENCY: ICD-10-CM

## 2023-07-05 DIAGNOSIS — Z85.3 HX: BREAST CANCER: ICD-10-CM

## 2023-07-05 DIAGNOSIS — F41.9 ANXIETY AND DEPRESSION: ICD-10-CM

## 2023-07-05 DIAGNOSIS — M77.31 CALCANEAL SPUR OF RIGHT FOOT: ICD-10-CM

## 2023-07-05 DIAGNOSIS — F32.A ANXIETY AND DEPRESSION: ICD-10-CM

## 2023-07-05 DIAGNOSIS — Z86.32 HX GESTATIONAL DIABETES: ICD-10-CM

## 2023-07-05 DIAGNOSIS — L29.9 PRURITIC CONDITION: ICD-10-CM

## 2023-07-05 DIAGNOSIS — N65.0 BREAST RECONSTRUCTION DEFORMITY: Primary | ICD-10-CM

## 2023-07-05 LAB
ALBUMIN SERPL-MCNC: 4.3 G/DL (ref 3.4–5)
ALBUMIN/GLOB SERPL: 1.3 {RATIO} (ref 1–2)
ALP LIVER SERPL-CCNC: 71 U/L
ALT SERPL-CCNC: 25 U/L
ANION GAP SERPL CALC-SCNC: 4 MMOL/L (ref 0–18)
AST SERPL-CCNC: 21 U/L (ref 15–37)
ATRIAL RATE: 64 BPM
BASOPHILS # BLD AUTO: 0.03 X10(3) UL (ref 0–0.2)
BASOPHILS NFR BLD AUTO: 0.7 %
BILIRUB SERPL-MCNC: 0.8 MG/DL (ref 0.1–2)
BUN BLD-MCNC: 16 MG/DL (ref 7–18)
CALCIUM BLD-MCNC: 9.7 MG/DL (ref 8.5–10.1)
CHLORIDE SERPL-SCNC: 106 MMOL/L (ref 98–112)
CO2 SERPL-SCNC: 28 MMOL/L (ref 21–32)
CREAT BLD-MCNC: 0.82 MG/DL
EOSINOPHIL # BLD AUTO: 0.08 X10(3) UL (ref 0–0.7)
EOSINOPHIL NFR BLD AUTO: 1.8 %
ERYTHROCYTE [DISTWIDTH] IN BLOOD BY AUTOMATED COUNT: 13.2 %
EST. AVERAGE GLUCOSE BLD GHB EST-MCNC: 123 MG/DL (ref 68–126)
FASTING STATUS PATIENT QL REPORTED: YES
GFR SERPLBLD BASED ON 1.73 SQ M-ARVRAT: 82 ML/MIN/1.73M2 (ref 60–?)
GLOBULIN PLAS-MCNC: 3.3 G/DL (ref 2.8–4.4)
GLUCOSE BLD-MCNC: 83 MG/DL (ref 70–99)
HBA1C MFR BLD: 5.9 % (ref ?–5.7)
HCT VFR BLD AUTO: 42.1 %
HGB BLD-MCNC: 13.7 G/DL
IMM GRANULOCYTES # BLD AUTO: 0.01 X10(3) UL (ref 0–1)
IMM GRANULOCYTES NFR BLD: 0.2 %
LYMPHOCYTES # BLD AUTO: 1.48 X10(3) UL (ref 1–4)
LYMPHOCYTES NFR BLD AUTO: 32.9 %
MCH RBC QN AUTO: 28 PG (ref 26–34)
MCHC RBC AUTO-ENTMCNC: 32.5 G/DL (ref 31–37)
MCV RBC AUTO: 86.1 FL
MONOCYTES # BLD AUTO: 0.38 X10(3) UL (ref 0.1–1)
MONOCYTES NFR BLD AUTO: 8.4 %
NEUTROPHILS # BLD AUTO: 2.52 X10 (3) UL (ref 1.5–7.7)
NEUTROPHILS # BLD AUTO: 2.52 X10(3) UL (ref 1.5–7.7)
NEUTROPHILS NFR BLD AUTO: 56 %
OSMOLALITY SERPL CALC.SUM OF ELEC: 286 MOSM/KG (ref 275–295)
P AXIS: 33 DEGREES
P-R INTERVAL: 146 MS
PLATELET # BLD AUTO: 222 10(3)UL (ref 150–450)
POTASSIUM SERPL-SCNC: 4.3 MMOL/L (ref 3.5–5.1)
PROT SERPL-MCNC: 7.6 G/DL (ref 6.4–8.2)
Q-T INTERVAL: 404 MS
QRS DURATION: 84 MS
QTC CALCULATION (BEZET): 416 MS
R AXIS: 55 DEGREES
RBC # BLD AUTO: 4.89 X10(6)UL
SODIUM SERPL-SCNC: 138 MMOL/L (ref 136–145)
T AXIS: 57 DEGREES
VENTRICULAR RATE: 64 BPM
WBC # BLD AUTO: 4.5 X10(3) UL (ref 4–11)

## 2023-07-05 PROCEDURE — 93000 ELECTROCARDIOGRAM COMPLETE: CPT | Performed by: FAMILY MEDICINE

## 2023-07-05 PROCEDURE — 3078F DIAST BP <80 MM HG: CPT | Performed by: FAMILY MEDICINE

## 2023-07-05 PROCEDURE — 3008F BODY MASS INDEX DOCD: CPT | Performed by: FAMILY MEDICINE

## 2023-07-05 PROCEDURE — 80053 COMPREHEN METABOLIC PANEL: CPT | Performed by: FAMILY MEDICINE

## 2023-07-05 PROCEDURE — 83036 HEMOGLOBIN GLYCOSYLATED A1C: CPT | Performed by: FAMILY MEDICINE

## 2023-07-05 PROCEDURE — 86003 ALLG SPEC IGE CRUDE XTRC EA: CPT | Performed by: FAMILY MEDICINE

## 2023-07-05 PROCEDURE — 3074F SYST BP LT 130 MM HG: CPT | Performed by: FAMILY MEDICINE

## 2023-07-05 PROCEDURE — 85025 COMPLETE CBC W/AUTO DIFF WBC: CPT | Performed by: FAMILY MEDICINE

## 2023-07-05 PROCEDURE — 99214 OFFICE O/P EST MOD 30 MIN: CPT | Performed by: FAMILY MEDICINE

## 2023-07-05 NOTE — PATIENT INSTRUCTIONS
Pt was called and she complaints of pulling muscle on the left side by her upper ribcage when putting a 40 lb child down 3 days ago. Pt stated good fetal movement and denied vaginal bleeding. Pt is currently in [place]. Informed pt that she can try taking tylenol, and warm (not hot) heating pad to the area for a few minutes at a time. Pt instructed to report to the nearest ER where she is if pain worsens. Pt voiced understanding.   Surgeon:         Dr. Damion Gorman                                        Tel:         [phone]                                  Fax:        [phone]    Surgery/Procedure: Bilateral breast implant exchange, autologous fat grafting to the bilateral breasts. General anesthesia, 3 hours, outpatient                                          Hospital:  [facility]: [address] [place] [address] [address]vd, [place], [address]           [phone]  [facility]: [address], [facility], [facility]               [phone]    1. Someone will need to drive you to and from the hospital if your procedure is outpatient. 2.Do not drink alcohol or smoke 24 hours prior to your procedure. 3. Bring a picture ID and your insurance card. 4. You will be contacted by the hospital the day before to confirm the procedure time and location. 5. The hospital will also contact you approximately one week before surgery to schedule your COVID test.     6. Do not take any herbal supplements or blood thinners at least one week before your procedure/surgery. This includes NSAID's (aspirin, baby aspirin, Motrin, Ibuprofen, Aleve, Advil, Naproxen, etc), Plavix, fish oil, vitamin E, turmeric, CoQ10, or green tea supplements, etc. *TYLENOL or acetaminophen is ok to take*    7. PRE-OPERATIVE TESTING: History and physical with medical clearance is REQUIRED within 30 days of the surgery date and is mandatory per Dr. Marlee Galeazzi. *If this is not done, your surgery will be postponed*  MEDICAL CLEARANCE WITH DR. Elkin Enamorado  CBC  CMP  EKG    8. Please inform us if you develop any Covid-19 like symptoms, test positive or have been exposed for Covid- 19 prior to surgery.      Consent obtained   Photos taken on 03/21/2023

## 2023-07-06 LAB
ALMOND IGE QN: 0.12 KUA/L (ref ?–0.1)
CASHEW NUT IGE QN: <0.1 KUA/L (ref ?–0.1)
WALNUT IGE QN: <0.1 KUA/L (ref ?–0.1)

## 2023-07-08 LAB — F203-IGE PISTACHIO NUT: <0.1 KU/L

## 2023-07-17 ENCOUNTER — TELEPHONE (OUTPATIENT)
Dept: SURGERY | Facility: CLINIC | Age: 58
End: 2023-07-17

## 2023-07-17 NOTE — TELEPHONE ENCOUNTER
Patient called to discuss her upcoming surgery on 07/31/2023. She stated that she is having second thoughts about the surgery and getting silicone implants. We discussed postponing her surgery and following up in the office with Dr. Nissa Zaman. She will call us back today or tomorrow with her final decision. 15 min were spent on the phone with the patient.

## 2023-07-18 ENCOUNTER — HOSPITAL ENCOUNTER (OUTPATIENT)
Dept: BONE DENSITY | Age: 58
Discharge: HOME | End: 2023-07-18
Attending: FAMILY MEDICINE
Payer: COMMERCIAL

## 2023-07-18 DIAGNOSIS — Z78.0 POSTMENOPAUSAL ESTROGEN DEFICIENCY: ICD-10-CM

## 2023-07-18 PROCEDURE — 77080 DXA BONE DENSITY AXIAL: CPT | Performed by: FAMILY MEDICINE

## 2023-07-19 DIAGNOSIS — Z98.890 H/O BREAST RECONSTRUCTION: Primary | ICD-10-CM

## 2023-08-09 ENCOUNTER — OFFICE VISIT (OUTPATIENT)
Dept: PODIATRY CLINIC | Facility: CLINIC | Age: 58
End: 2023-08-09

## 2023-08-09 VITALS — SYSTOLIC BLOOD PRESSURE: 110 MMHG | DIASTOLIC BLOOD PRESSURE: 72 MMHG

## 2023-08-09 DIAGNOSIS — M72.2 PLANTAR FASCIITIS OF RIGHT FOOT: Primary | ICD-10-CM

## 2023-08-09 DIAGNOSIS — M77.31 CALCANEAL SPUR OF RIGHT FOOT: ICD-10-CM

## 2023-08-09 DIAGNOSIS — M79.671 INFLAMMATORY HEEL PAIN, RIGHT: ICD-10-CM

## 2023-08-10 RX ORDER — TRIAMCINOLONE ACETONIDE 40 MG/ML
40 INJECTION, SUSPENSION INTRA-ARTICULAR; INTRAMUSCULAR ONCE
Status: COMPLETED | OUTPATIENT
Start: 2023-08-10 | End: 2023-08-09

## 2023-08-11 NOTE — PROGRESS NOTES
Per Dr. Fatou Ramsey to draw up 1ml of Kenalog 40 and 1ml of 0.5% Marcaine for injection to right foot.

## 2023-08-13 NOTE — PROGRESS NOTES
Madeleine Franklin is a 59year old female. Patient presents with: Foot Pain: Right heel pain- swollen- patient states that she has a heel spur- painful when walking barefoot- patient denies pain at this time. HPI:   This pleasant patient presents to the clinic with a chief complaint of right heel pain. This has been going on now for some time its worst in the morning or after rising from a prolonged nonweightbearing position she points to the bottom of her right heel at today's visit reviewed nurse's history as taken above, allergies medications and medical history as documented below. All changes duly noted  Allergies: Fish, Other, Peanuts, Shellfish Allergy, and Shrimp   Current Outpatient Medications   Medication Sig Dispense Refill    Magnesium-Potassium 250-100 MG Oral Tab Take by mouth daily. omega-3 fatty acids 1000 MG Oral Cap Take by mouth daily. Multiple Vitamins-Minerals (MULTI-VITAMIN/MINERALS) Oral Tab Take 1 tablet by mouth daily. B Complex Vitamins (B COMPLEX OR) Take  by mouth daily. Cholecalciferol (VITAMIN D) 1000 UNITS Oral Cap Take 5,000 Units by mouth daily.           Past Medical History:   Diagnosis Date    ASCUS (atypical squamous cells of undetermined significance) on Pap smear     Cancer (HCC)     breast cancer - right    Menopausal symptoms     Murmur, cardiac     function heart murmur - childhood Rhumatic fever - no RX    PONV (postoperative nausea and vomiting)       Past Surgical History:   Procedure Laterality Date    BREAST BIOPSY      MASTECTOMY LEFT  04/16/14    MASTECTOMY RIGHT  04/16/14    OOPHORECTOMY      ORTHOPEDIC SURG (PBP)      tendon transplant right knee    TONSILLECTOMY        Family History   Problem Relation Age of Onset    Allergies Mother     Hypertension Mother     High Cholesterol Mother     Gastro-Intestinal Disorder Mother         GERD    Hypertension Father     Arthritis Father         On father's side    Other (Parkinson's disease) Father     Other (Carotid Artery DS) Father       Social History    Socioeconomic History      Marital status:     Occupational History      Occupation:     Tobacco Use      Smoking status: Never      Smokeless tobacco: Never      Tobacco comments: Non-smoker    Vaping Use      Vaping Use: Never used    Substance and Sexual Activity      Alcohol use: No        Alcohol/week: 0.0 standard drinks of alcohol        Comment: occ      Drug use: No        Comment: No HIV high risk behavior    Other Topics      Concerns:        Caffeine Concern: Yes          1 drink/day        Exercise: Yes          3 times/week  Walks        Seat Belt: Yes          100%          REVIEW OF SYSTEMS:   Today reviewed systens as documented below  GENERAL HEALTH: feels well otherwise  SKIN: Refer to exam below  RESPIRATORY: denies shortness of breath with exertion  CARDIOVASCULAR: denies chest pain on exertion  GI: denies abdominal pain and denies heartburn  NEURO: denies headaches    EXAM:   /72 (BP Location: Right arm, Patient Position: Sitting, Cuff Size: adult)   GENERAL: well developed, well nourished, in no apparent distress  EXTREMITIES:   1. Integument: The skin on her right foot was evaluated its warm and dry. 2. Vascular: The patient has palpable pulses both dorsalis pedis and posterior tibial on the right   3. Neurologic: Patient has intact sensorium there is no deficits noted   4. Musculoskeletal: Patient has good muscle strength. She has pain on palpation of the plantar tubercles of the calcaneus in particular the medial side.   X-rays were taken 2 views of the heel there are no fractures or dislocations noted but there is a small infracalcaneal spur present    ASSESSMENT AND PLAN:   Diagnoses and all orders for this visit:    Plantar fasciitis of right foot  -     [facility] PODIATRY XR - RIGHT HEEL 2 VIEWS(CALCANEUS)  -     triamcinolone acetonide (Kenalog-40) 40 MG/ML injection 40 mg    Inflammatory heel pain, right  -     [facility] PODIATRY XR - RIGHT HEEL 2 VIEWS(CALCANEUS)  -     triamcinolone acetonide (Kenalog-40) 40 MG/ML injection 40 mg    Calcaneal spur of right foot  -     [facility] PODIATRY XR - RIGHT HEEL 2 VIEWS(CALCANEUS)  -     triamcinolone acetonide (Kenalog-40) 40 MG/ML injection 40 mg        Plan: Today educated the patient about the problem. Dispensed stretching exercises. Today discussed the nature and extent of a cortisone injection as well as the possible complications and risks. Patient was in agreement to receive the injection. The patient was consented for a cortisone injection and after timeout was taken the injection consisting of 40 mg Kenalog and 1.0 cc of 0.5% Marcaine plain was injected into the symptomatic plantar fascia of the right heel using aseptic technique and appropriate approach. A Band-Aid was applied to the injection site. Patient advised not to walk barefoot until tomorrow. Gave the patient stretching exercises to do and advised follow-up again in about a month and we dispensed copies of her diagnoses codes to her so she could call and see if orthotics are covered which I highly recommend. The patient indicates understanding of these issues and agrees to the plan.     Damian Garcia DPM

## 2023-08-16 ENCOUNTER — OFFICE VISIT (OUTPATIENT)
Dept: PODIATRY CLINIC | Facility: CLINIC | Age: 58
End: 2023-08-16

## 2023-08-16 DIAGNOSIS — M79.671 INFLAMMATORY HEEL PAIN, RIGHT: ICD-10-CM

## 2023-08-16 DIAGNOSIS — M77.31 CALCANEAL SPUR OF RIGHT FOOT: ICD-10-CM

## 2023-08-16 DIAGNOSIS — M72.2 PLANTAR FASCIITIS OF RIGHT FOOT: Primary | ICD-10-CM

## 2023-08-16 NOTE — PROGRESS NOTES
Ileana Garcia is a 59year old female. Patient presents with: Follow - Up: Casting for orthotics- the cortisone injection helped- patient denies pain at this time. HPI:   Patient was seen at today's visit doing much better with her heel had some questions about orthotics and we discussed that at some length at today's visit reviewed nurse's history as taken above, allergies medications and medical history as documented below. All changes duly noted  Allergies: Fish, Other, Peanuts, Shellfish Allergy, and Shrimp   Current Outpatient Medications   Medication Sig Dispense Refill    Magnesium-Potassium 250-100 MG Oral Tab Take by mouth daily. omega-3 fatty acids 1000 MG Oral Cap Take by mouth daily. Multiple Vitamins-Minerals (MULTI-VITAMIN/MINERALS) Oral Tab Take 1 tablet by mouth daily. B Complex Vitamins (B COMPLEX OR) Take  by mouth daily. Cholecalciferol (VITAMIN D) 1000 UNITS Oral Cap Take 5,000 Units by mouth daily.           Past Medical History:   Diagnosis Date    ASCUS (atypical squamous cells of undetermined significance) on Pap smear     Cancer (HCC)     breast cancer - right    Menopausal symptoms     Murmur, cardiac     function heart murmur - childhood Rhumatic fever - no RX    PONV (postoperative nausea and vomiting)       Past Surgical History:   Procedure Laterality Date    BREAST BIOPSY      MASTECTOMY LEFT  04/16/14    MASTECTOMY RIGHT  04/16/14    OOPHORECTOMY      ORTHOPEDIC SURG (PBP)      tendon transplant right knee    TONSILLECTOMY        Family History   Problem Relation Age of Onset    Allergies Mother     Hypertension Mother     High Cholesterol Mother     Gastro-Intestinal Disorder Mother         GERD    Hypertension Father     Arthritis Father         On father's side    Other (Parkinson's disease) Father     Other (Carotid Artery DS) Father       Social History    Socioeconomic History      Marital status:     Occupational History      Occupation:     Tobacco Use      Smoking status: Never      Smokeless tobacco: Never      Tobacco comments: Non-smoker    Vaping Use      Vaping Use: Never used    Substance and Sexual Activity      Alcohol use: No        Alcohol/week: 0.0 standard drinks of alcohol        Comment: occ      Drug use: No        Comment: No HIV high risk behavior    Other Topics      Concerns:        Caffeine Concern: Yes          1 drink/day        Exercise: Yes          3 times/week  Walks        Seat Belt: Yes          100%          REVIEW OF SYSTEMS:   Today reviewed systens as documented below  GENERAL HEALTH: feels well otherwise  SKIN: Refer to exam below  RESPIRATORY: denies shortness of breath with exertion  CARDIOVASCULAR: denies chest pain on exertion  GI: denies abdominal pain and denies heartburn  NEURO: denies headaches    EXAM:   There were no vitals taken for this visit. GENERAL: well developed, well nourished, in no apparent distress  EXTREMITIES:   Today while maintaining the patient's feet in their neutral but correct positions plaster of Renetta splints were used to take molds for custom orthotics. ASSESSMENT AND PLAN:   Diagnoses and all orders for this visit:    Plantar fasciitis of right foot    Inflammatory heel pain, right    Calcaneal spur of right foot        Plan: Continue with stretching exercises follow-up again in 4 to 5 weeks to receive her orthotics. The patient indicates understanding of these issues and agrees to the plan.     Jayy Rice DPM

## 2023-08-21 ENCOUNTER — TELEPHONE (OUTPATIENT)
Dept: FAMILY MEDICINE CLINIC | Facility: CLINIC | Age: 58
End: 2023-08-21

## 2023-08-21 NOTE — TELEPHONE ENCOUNTER
Patient calling asking for the names and strengths of antidepressant medications she has been prescribed in the past.  Buspar and effexor strength and dates of prescriptions given to patient.

## 2023-09-13 ENCOUNTER — TELEPHONE (OUTPATIENT)
Dept: PODIATRY CLINIC | Facility: CLINIC | Age: 58
End: 2023-09-13

## 2023-10-19 ENCOUNTER — OFFICE VISIT (OUTPATIENT)
Dept: FAMILY MEDICINE CLINIC | Facility: CLINIC | Age: 58
End: 2023-10-19
Payer: COMMERCIAL

## 2023-10-19 VITALS
SYSTOLIC BLOOD PRESSURE: 110 MMHG | HEART RATE: 69 BPM | RESPIRATION RATE: 16 BRPM | OXYGEN SATURATION: 99 % | WEIGHT: 166 LBS | DIASTOLIC BLOOD PRESSURE: 62 MMHG | BODY MASS INDEX: 26 KG/M2

## 2023-10-19 DIAGNOSIS — S97.82XA CRUSHING INJURY OF LEFT FOOT, INITIAL ENCOUNTER: Primary | ICD-10-CM

## 2023-10-19 PROCEDURE — 99212 OFFICE O/P EST SF 10 MIN: CPT

## 2023-10-19 PROCEDURE — 3078F DIAST BP <80 MM HG: CPT

## 2023-10-19 PROCEDURE — 3074F SYST BP LT 130 MM HG: CPT

## 2023-10-19 RX ORDER — VILAZODONE HYDROCHLORIDE 20 MG/1
20 TABLET ORAL DAILY
COMMUNITY
Start: 2023-10-02

## 2023-10-23 ENCOUNTER — NURSE ONLY (OUTPATIENT)
Dept: FAMILY MEDICINE CLINIC | Facility: CLINIC | Age: 58
End: 2023-10-23
Payer: COMMERCIAL

## 2023-10-23 DIAGNOSIS — Z11.1 SCREENING FOR TUBERCULOSIS: Primary | ICD-10-CM

## 2023-10-23 NOTE — PROGRESS NOTES
Pt here for TB placement. LT arm. Placed at 11:18AM - will come back Wednesday after 11:20AM. Pt V/U - tolerated well.

## 2023-10-25 ENCOUNTER — NURSE ONLY (OUTPATIENT)
Dept: FAMILY MEDICINE CLINIC | Facility: CLINIC | Age: 58
End: 2023-10-25

## 2023-10-25 NOTE — PROGRESS NOTES
TB read, 0.0 induration. 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 Copy sent to scanning. A-T Advancement Flap Text: The defect edges were debeveled with a #15 scalpel blade.  Given the location of the defect, shape of the defect and the proximity to free margins an A-T advancement flap was deemed most appropriate.  Using a sterile surgical marker, an appropriate advancement flap was drawn incorporating the defect and placing the expected incisions within the relaxed skin tension lines where possible.    The area thus outlined was incised deep to adipose tissue with a #15 scalpel blade.  The skin margins were undermined to an appropriate distance in all directions utilizing iris scissors.

## 2023-11-08 ENCOUNTER — HOSPITAL ENCOUNTER (OUTPATIENT)
Age: 58
Discharge: HOME | End: 2023-11-08
Payer: COMMERCIAL

## 2023-11-08 ENCOUNTER — OFFICE VISIT (OUTPATIENT)
Dept: FAMILY MEDICINE CLINIC | Facility: CLINIC | Age: 58
End: 2023-11-08
Payer: COMMERCIAL

## 2023-11-08 VITALS
DIASTOLIC BLOOD PRESSURE: 70 MMHG | HEIGHT: 67 IN | RESPIRATION RATE: 16 BRPM | BODY MASS INDEX: 25.58 KG/M2 | SYSTOLIC BLOOD PRESSURE: 120 MMHG | WEIGHT: 163 LBS | OXYGEN SATURATION: 98 % | TEMPERATURE: 98 F | HEART RATE: 76 BPM

## 2023-11-08 VITALS
BODY MASS INDEX: 25.58 KG/M2 | SYSTOLIC BLOOD PRESSURE: 134 MMHG | WEIGHT: 163 LBS | DIASTOLIC BLOOD PRESSURE: 75 MMHG | HEIGHT: 67 IN | TEMPERATURE: 98 F | OXYGEN SATURATION: 100 % | HEART RATE: 85 BPM | RESPIRATION RATE: 16 BRPM

## 2023-11-08 DIAGNOSIS — J22 LOWER RESPIRATORY INFECTION: Primary | ICD-10-CM

## 2023-11-08 DIAGNOSIS — R05.1 ACUTE COUGH: Primary | ICD-10-CM

## 2023-11-08 PROCEDURE — 99203 OFFICE O/P NEW LOW 30 MIN: CPT | Performed by: PHYSICIAN ASSISTANT

## 2023-11-08 PROCEDURE — 3078F DIAST BP <80 MM HG: CPT | Performed by: PHYSICIAN ASSISTANT

## 2023-11-08 PROCEDURE — 3075F SYST BP GE 130 - 139MM HG: CPT | Performed by: PHYSICIAN ASSISTANT

## 2023-11-08 PROCEDURE — 3008F BODY MASS INDEX DOCD: CPT | Performed by: PHYSICIAN ASSISTANT

## 2023-11-08 RX ORDER — BENZONATATE 100 MG/1
100 CAPSULE ORAL 3 TIMES DAILY PRN
Qty: 30 CAPSULE | Refills: 0 | Status: SHIPPED | OUTPATIENT
Start: 2023-11-08 | End: 2023-12-08

## 2023-11-08 RX ORDER — ALBUTEROL SULFATE 90 UG/1
2 AEROSOL, METERED RESPIRATORY (INHALATION) EVERY 4 HOURS PRN
Qty: 1 EACH | Refills: 0 | Status: SHIPPED | OUTPATIENT
Start: 2023-11-08 | End: 2023-12-08

## 2023-11-08 RX ORDER — CEFDINIR 300 MG/1
300 CAPSULE ORAL 2 TIMES DAILY
Qty: 20 CAPSULE | Refills: 0 | Status: SHIPPED | OUTPATIENT
Start: 2023-11-08 | End: 2023-11-18

## 2023-11-09 NOTE — DISCHARGE INSTRUCTIONS
Antibiotic as written. 2 puffs of inhaler every 4 hours as needed. Tessalon for cough suppression.   If you develop worsening shortness of breath, fever, night sweats or other worsening please seek reevaluation

## 2024-02-07 NOTE — TELEPHONE ENCOUNTER
Lee Menchaca IN FRIDAY TO FOR A POSSIBLE UTI/KIDNEY INFECTION, SHE WAS GIVEN MEDICATION AND SHE ISN'T FEELING ANY BETTER.
Notes recorded by Denise Feliciano DO on 9/17/2018 at 7:25 AM CDT  Notify urine culture negative. Discontinue antibiotics. RTC if still symptomatic. Pt aware of results.  She states she is still having Urethra irritation, back ache and pain, along w
Patient tolerated procedure well.

## 2024-06-02 NOTE — H&P
HPI:   Rafael Frazier is a 59 year old female who presents for a complete physical exam. Symptoms: is menopausal. Patient complains of needing physical. No acute issues.  Retired 1 year ago.   Cares for grandchildren.  Did not exchange her implants - saw Dr. Noel and recommended  replacement with silicone. Struggles with MRI tests.     Struggles with deprsssion and sees psychiatrist.  Has helped. Has MTHR + .current med is helping    Complains of painful knees. Worse when on ground and can't get up  due to pain. Does not keep awake. Had knee surgery as a teen.     Immunization History   Administered Date(s) Administered    Covid-19 Vaccine Pfizer 30 mcg/0.3 ml 05/26/2021    FLULAVAL 6 months & older 0.5 ml Prefilled syringe (61626) 10/23/2020    FLUZONE 6 months and older PFS 0.5 ml (24980) 04/01/2022    Influenza 10/08/2018, 01/21/2020    Pneumococcal (Prevnar 13) 10/23/2020    TDAP 10/23/2020    Tb Intradermal Test 10/23/2023   Pended Date(s) Pended    Zoster Vaccine Recombinant Adjuvanted (Shingrix) 06/02/2024     Wt Readings from Last 6 Encounters:   06/03/24 167 lb 6 oz (75.9 kg)   11/08/23 163 lb (73.9 kg)   11/08/23 163 lb (73.9 kg)   10/19/23 166 lb (75.3 kg)   07/05/23 164 lb (74.4 kg)   02/01/23 161 lb 8 oz (73.3 kg)     Body mass index is 26.02 kg/m².     Lab Results   Component Value Date    GLU 83 07/05/2023     (H) 02/01/2023    GLU 98 05/09/2022     Lab Results   Component Value Date    CHOLEST 256 (H) 05/09/2022    CHOLEST 209 (H) 10/23/2020    CHOLEST 177 03/31/2017     Lab Results   Component Value Date    HDL 81 (H) 05/09/2022    HDL 91 (H) 10/23/2020    HDL 81 03/31/2017     Lab Results   Component Value Date     (H) 05/09/2022     (H) 10/23/2020    LDL 83 03/31/2017     Lab Results   Component Value Date    AST 21 07/05/2023    AST 23 02/01/2023    AST 16 05/09/2022     Lab Results   Component Value Date    ALT 25 07/05/2023    ALT 26 02/01/2023    ALT 23 05/09/2022        Current Outpatient Medications   Medication Sig Dispense Refill    vilazodone 20 MG Oral Tab Take 1 tablet (20 mg total) by mouth daily.      Magnesium-Potassium 250-100 MG Oral Tab Take by mouth daily.      omega-3 fatty acids 1000 MG Oral Cap Take by mouth daily.      Multiple Vitamins-Minerals (MULTI-VITAMIN/MINERALS) Oral Tab Take 1 tablet by mouth daily.      B Complex Vitamins (B COMPLEX OR) Take  by mouth daily.      Cholecalciferol (VITAMIN D) 1000 UNITS Oral Cap Take 5,000 Units by mouth daily.          Past Medical History:    ASCUS (atypical squamous cells of undetermined significance) on Pap smear    Cancer (HCC)    breast cancer - right    Menopausal symptoms    Murmur, cardiac    function heart murmur - childhood Rhumatic fever - no RX    PONV (postoperative nausea and vomiting)      Past Surgical History:   Procedure Laterality Date    Breast biopsy      Mastectomy left  04/16/14    Mastectomy right  04/16/14    Oophorectomy      Orthopedic surg (pbp)      tendon transplant right knee    Tonsillectomy        Family History   Problem Relation Age of Onset    Allergies Mother     Hypertension Mother     High Cholesterol Mother     Gastro-Intestinal Disorder Mother         GERD    Hypertension Father     Arthritis Father         On father's side    Other (Parkinson's disease) Father     Other (Carotid Artery DS) Father       Social History:   Social History     Socioeconomic History    Marital status:    Occupational History    Occupation:    Tobacco Use    Smoking status: Never    Smokeless tobacco: Never    Tobacco comments:     Non-smoker   Vaping Use    Vaping status: Never Used   Substance and Sexual Activity    Alcohol use: No     Alcohol/week: 0.0 standard drinks of alcohol     Comment: occ    Drug use: No     Comment: No HIV high risk behavior   Other Topics Concern    Caffeine Concern Yes     Comment: 1 drink/day    Exercise Yes     Comment: 3 times/week  Walks    Seat  Belt Yes     Comment: 100%     Social Determinants of Health      Received from [facility]    Housing Stability     Occ: mom. : yes. Children: 2.   Exercise: walking.  Diet: low carb diet     REVIEW OF SYSTEMS:   GENERAL: feels well otherwise  SKIN: denies any unusual skin lesions  EYES:denies blurred vision or double vision  HEENT: denies nasal congestion, sinus pain or ST  LUNGS: denies shortness of breath with exertion  CARDIOVASCULAR: denies chest pain on exertion  GI: denies abdominal pain,denies heartburn  : denies dysuria, vaginal discharge or itching  MUSCULOSKELETAL: denies back pain  NEURO: denies headaches  PSYCHE: + depression or anxiety  HEMATOLOGIC: denies hx of anemia  ENDOCRINE: denies thyroid history  ALL/ASTHMA: denies hx of allergy or asthma    EXAM:   /64   Pulse 73   Temp 98 °F (36.7 °C) (Tympanic)   Ht 5' 7.25\" (1.708 m)   Wt 167 lb 6 oz (75.9 kg)   SpO2 96%   BMI 26.02 kg/m²   Body mass index is 26.02 kg/m².   GENERAL: well developed, well nourished,in no apparent distress  SKIN: no rashes,no suspicious lesions  HEENT: atraumatic, normocephalic,ears and throat are clear  EYES:PERRLA, EOMI, conjunctiva are clear  NECK: supple,no adenopathy,no bruits  CHEST: no chest tenderness  BREAST:  bilat breast implants, scars consistent with this. Disfigurement noted in both breasts. No masses  LUNGS: clear to auscultation  CARDIO: RRR without murmur  GI: good BS's,no masses, HSM or tenderness  :introitus is normal,scant discharge,cervix is pink,no adnexal masses or tenderness, PAP was done   MUSCULOSKELETAL: back is not tender,FROM of the back  EXTREMITIES: no cyanosis, clubbing or edema, crepitance knees, no effusion    NEURO: Oriented times three,cranial nerves are intact,motor and sensory are grossly intact    Results for orders placed or performed in visit on 10/23/23   TB /PPD intradermal test    Collection Time: 10/23/23 11:13 AM   Result Value Ref Range     Date Given: 10/23/23     Site: left forearm     INDURATION (PPD) 0.0 0.0 - 11 mm       ASSESSMENT AND PLAN:   Rafael Frazier is a 59 year old female who presents for a complete physical exam.    1. Routine adult health maintenance  - anticipatory care discussed  - diet  - sleep  - stress management  - funcitonal movment  - CBC With Differential With Platelet; Future  - Comp Metabolic Panel; Future  - Lipid Panel; Future  - Hemoglobin A1C; Future  - TSH W Reflex To Free T4; Future  - ThinPrep PAP Smear; Future  - Hpv Dna  High Risk , Thin Prep Collect; Future  - GASTRO - INTERNAL    2. Hx gestational diabetes  - A1C    3. Postmenopausal estrogen deficiency  - stable    4. Multiple food allergies  - avoid2    5. Anxiety and depression  - off meds  -  CBT    6. HX: breast cancer  - annual monitoring  - continue vilazodone 20 mg daily      7. Need for vaccination  - Immunization Admin Counseling, 1st Component, 18 years and older  - Shingrix 1st Dose (Today)  - Shingrix Vaccine 2nd Dose (Future); Future    8. Colon cancer screening  = GI - Dr Mcdaniels    9. Screening for cervical cancer  - ThinPrep PAP Smear; Future  - Hpv Dna  High Risk , Thin Prep Collect; Future  - GASTRO - INTERNAL    10. Osteorthritis bilateral knees  - advil  - Dr. Boles referral       Pt info handouts given for: exercise, low fat diet and breast self-exam. Pt' s weight is Body mass index is 26.02 kg/m²., recommended low fat diet and aerobic exercise 30 minutes three times weekly.  The patient indicates understanding of these issues and agrees to the plan.    The patient is asked to return for CPX in 1 year.

## 2024-06-03 ENCOUNTER — LABORATORY ENCOUNTER (OUTPATIENT)
Dept: LAB | Age: 59
End: 2024-06-03
Attending: FAMILY MEDICINE
Payer: COMMERCIAL

## 2024-06-03 ENCOUNTER — OFFICE VISIT (OUTPATIENT)
Dept: FAMILY MEDICINE CLINIC | Facility: CLINIC | Age: 59
End: 2024-06-03
Payer: COMMERCIAL

## 2024-06-03 VITALS
WEIGHT: 167.38 LBS | OXYGEN SATURATION: 96 % | TEMPERATURE: 98 F | DIASTOLIC BLOOD PRESSURE: 64 MMHG | BODY MASS INDEX: 25.96 KG/M2 | SYSTOLIC BLOOD PRESSURE: 102 MMHG | HEIGHT: 67.25 IN | HEART RATE: 73 BPM

## 2024-06-03 DIAGNOSIS — Z12.11 COLON CANCER SCREENING: ICD-10-CM

## 2024-06-03 DIAGNOSIS — F32.A ANXIETY AND DEPRESSION: ICD-10-CM

## 2024-06-03 DIAGNOSIS — Z86.32 HX GESTATIONAL DIABETES: ICD-10-CM

## 2024-06-03 DIAGNOSIS — Z23 NEED FOR VACCINATION: ICD-10-CM

## 2024-06-03 DIAGNOSIS — Z91.018 MULTIPLE FOOD ALLERGIES: ICD-10-CM

## 2024-06-03 DIAGNOSIS — Z12.4 SCREENING FOR CERVICAL CANCER: ICD-10-CM

## 2024-06-03 DIAGNOSIS — Z00.00 ROUTINE ADULT HEALTH MAINTENANCE: Primary | ICD-10-CM

## 2024-06-03 DIAGNOSIS — Z85.3 HX: BREAST CANCER: ICD-10-CM

## 2024-06-03 DIAGNOSIS — M17.2 POST-TRAUMATIC OSTEOARTHRITIS OF BOTH KNEES: ICD-10-CM

## 2024-06-03 DIAGNOSIS — F41.9 ANXIETY AND DEPRESSION: ICD-10-CM

## 2024-06-03 DIAGNOSIS — Z78.0 POSTMENOPAUSAL ESTROGEN DEFICIENCY: ICD-10-CM

## 2024-06-03 DIAGNOSIS — Z00.00 ROUTINE ADULT HEALTH MAINTENANCE: ICD-10-CM

## 2024-06-03 LAB
ALBUMIN SERPL-MCNC: 4 G/DL (ref 3.4–5)
ALBUMIN/GLOB SERPL: 1.2 {RATIO} (ref 1–2)
ALP LIVER SERPL-CCNC: 74 U/L
ALT SERPL-CCNC: 24 U/L
ANION GAP SERPL CALC-SCNC: 8 MMOL/L (ref 0–18)
AST SERPL-CCNC: 20 U/L (ref 15–37)
BASOPHILS # BLD AUTO: 0.03 X10(3) UL (ref 0–0.2)
BASOPHILS NFR BLD AUTO: 0.6 %
BILIRUB SERPL-MCNC: 0.8 MG/DL (ref 0.1–2)
BUN BLD-MCNC: 14 MG/DL (ref 9–23)
CALCIUM BLD-MCNC: 9.4 MG/DL (ref 8.5–10.1)
CHLORIDE SERPL-SCNC: 106 MMOL/L (ref 98–112)
CHOLEST SERPL-MCNC: 237 MG/DL (ref ?–200)
CO2 SERPL-SCNC: 26 MMOL/L (ref 21–32)
CREAT BLD-MCNC: 0.75 MG/DL
EGFRCR SERPLBLD CKD-EPI 2021: 91 ML/MIN/1.73M2 (ref 60–?)
EOSINOPHIL # BLD AUTO: 0.17 X10(3) UL (ref 0–0.7)
EOSINOPHIL NFR BLD AUTO: 3.2 %
ERYTHROCYTE [DISTWIDTH] IN BLOOD BY AUTOMATED COUNT: 13.4 %
EST. AVERAGE GLUCOSE BLD GHB EST-MCNC: 126 MG/DL (ref 68–126)
FASTING PATIENT LIPID ANSWER: YES
FASTING STATUS PATIENT QL REPORTED: YES
GLOBULIN PLAS-MCNC: 3.4 G/DL (ref 2.8–4.4)
GLUCOSE BLD-MCNC: 98 MG/DL (ref 70–99)
HBA1C MFR BLD: 6 % (ref ?–5.7)
HCT VFR BLD AUTO: 42.3 %
HDLC SERPL-MCNC: 76 MG/DL (ref 40–59)
HGB BLD-MCNC: 13.8 G/DL
IMM GRANULOCYTES # BLD AUTO: 0.02 X10(3) UL (ref 0–1)
IMM GRANULOCYTES NFR BLD: 0.4 %
LDLC SERPL CALC-MCNC: 128 MG/DL (ref ?–100)
LYMPHOCYTES # BLD AUTO: 1.84 X10(3) UL (ref 1–4)
LYMPHOCYTES NFR BLD AUTO: 34.9 %
MCH RBC QN AUTO: 29.1 PG (ref 26–34)
MCHC RBC AUTO-ENTMCNC: 32.6 G/DL (ref 31–37)
MCV RBC AUTO: 89.1 FL
MONOCYTES # BLD AUTO: 0.45 X10(3) UL (ref 0.1–1)
MONOCYTES NFR BLD AUTO: 8.5 %
NEUTROPHILS # BLD AUTO: 2.76 X10 (3) UL (ref 1.5–7.7)
NEUTROPHILS # BLD AUTO: 2.76 X10(3) UL (ref 1.5–7.7)
NEUTROPHILS NFR BLD AUTO: 52.4 %
NONHDLC SERPL-MCNC: 161 MG/DL (ref ?–130)
OSMOLALITY SERPL CALC.SUM OF ELEC: 290 MOSM/KG (ref 275–295)
PLATELET # BLD AUTO: 207 10(3)UL (ref 150–450)
POTASSIUM SERPL-SCNC: 4.5 MMOL/L (ref 3.5–5.1)
PROT SERPL-MCNC: 7.4 G/DL (ref 6.4–8.2)
RBC # BLD AUTO: 4.75 X10(6)UL
SODIUM SERPL-SCNC: 140 MMOL/L (ref 136–145)
TRIGL SERPL-MCNC: 189 MG/DL (ref 30–149)
TSI SER-ACNC: 1.53 MIU/ML (ref 0.36–3.74)
VLDLC SERPL CALC-MCNC: 34 MG/DL (ref 0–30)
WBC # BLD AUTO: 5.3 X10(3) UL (ref 4–11)

## 2024-06-03 PROCEDURE — 87624 HPV HI-RISK TYP POOLED RSLT: CPT | Performed by: FAMILY MEDICINE

## 2024-06-03 PROCEDURE — 80061 LIPID PANEL: CPT | Performed by: FAMILY MEDICINE

## 2024-06-03 PROCEDURE — 83036 HEMOGLOBIN GLYCOSYLATED A1C: CPT | Performed by: FAMILY MEDICINE

## 2024-06-03 PROCEDURE — 80050 GENERAL HEALTH PANEL: CPT | Performed by: FAMILY MEDICINE

## 2024-06-04 LAB — HPV I/H RISK 1 DNA SPEC QL NAA+PROBE: NEGATIVE

## 2024-06-05 DIAGNOSIS — E78.00 ELEVATED CHOLESTEROL: ICD-10-CM

## 2024-06-05 DIAGNOSIS — Z86.32 HX GESTATIONAL DIABETES: Primary | ICD-10-CM

## 2024-06-05 DIAGNOSIS — E78.2 ELEVATED CHOLESTEROL WITH ELEVATED TRIGLYCERIDES: ICD-10-CM

## 2024-06-06 LAB
.: NORMAL
.: NORMAL

## 2024-09-18 ENCOUNTER — TELEPHONE (OUTPATIENT)
Dept: FAMILY MEDICINE CLINIC | Facility: CLINIC | Age: 59
End: 2024-09-18

## 2024-09-18 DIAGNOSIS — Z23 NEED FOR VACCINATION: Primary | ICD-10-CM

## 2024-09-18 NOTE — TELEPHONE ENCOUNTER
Scheduled patient for 2nd Shingrix    Future Appointments   Date Time Provider Department Center   9/24/2024 11:00 AM [facility] [place]

## 2024-09-18 NOTE — TELEPHONE ENCOUNTER
Pt needs 2nd Shingrix-1st was 06/03/24.  They are given 2-6 months apart.  Will call patient and schedule her and placed order.    Pt did not answer.  Left message for her to call back.  Order has been placed.

## 2024-09-18 NOTE — TELEPHONE ENCOUNTER
Patient needs to schedule a nurse appointment for a booster vaccination, she did not remember what vaccine it was that she needs.

## 2024-09-24 ENCOUNTER — NURSE ONLY (OUTPATIENT)
Dept: FAMILY MEDICINE CLINIC | Facility: CLINIC | Age: 59
End: 2024-09-24
Payer: COMMERCIAL

## 2024-09-24 PROCEDURE — 90750 HZV VACC RECOMBINANT IM: CPT | Performed by: FAMILY MEDICINE

## 2024-09-24 PROCEDURE — 90471 IMMUNIZATION ADMIN: CPT | Performed by: FAMILY MEDICINE

## 2024-09-24 NOTE — PROGRESS NOTES
Pt here for 2nd shingles shot. Given per protcol without incident. VIS given and pt dc'd home stable.

## 2024-11-06 ENCOUNTER — OFFICE VISIT (OUTPATIENT)
Dept: FAMILY MEDICINE CLINIC | Facility: CLINIC | Age: 59
End: 2024-11-06
Payer: COMMERCIAL

## 2024-11-06 VITALS
SYSTOLIC BLOOD PRESSURE: 114 MMHG | RESPIRATION RATE: 16 BRPM | WEIGHT: 171.25 LBS | HEART RATE: 82 BPM | DIASTOLIC BLOOD PRESSURE: 66 MMHG | OXYGEN SATURATION: 98 % | TEMPERATURE: 98 F | BODY MASS INDEX: 27 KG/M2

## 2024-11-06 DIAGNOSIS — M25.562 ACUTE PAIN OF LEFT KNEE: Primary | ICD-10-CM

## 2024-11-06 PROCEDURE — 3074F SYST BP LT 130 MM HG: CPT | Performed by: INTERNAL MEDICINE

## 2024-11-06 PROCEDURE — 3078F DIAST BP <80 MM HG: CPT | Performed by: INTERNAL MEDICINE

## 2024-11-06 PROCEDURE — 99214 OFFICE O/P EST MOD 30 MIN: CPT | Performed by: INTERNAL MEDICINE

## 2024-11-06 NOTE — PROGRESS NOTES
Rafael Frazier is a 59 year old female.  Pt has pain in the left knee after sliding on gravel going down hill about 2 weeks ago.  She was hiking with her brother in [place]. It is better, but not completely.  It is painful laterally in the top of the muscle belly. It did bruise and swell. She is able to bear weight and it is not buckling or locking, She is not using medications. She has prior inury to both knees as a child. Surgical correction of the right and left his a fence post when she was riding a horse and has a large bony protuberance.   Current Outpatient Medications   Medication Sig Dispense Refill    vilazodone 20 MG Oral Tab Take 1 tablet (20 mg total) by mouth daily.      Magnesium-Potassium 250-100 MG Oral Tab Take by mouth daily.      omega-3 fatty acids 1000 MG Oral Cap Take by mouth daily.      Multiple Vitamins-Minerals (MULTI-VITAMIN/MINERALS) Oral Tab Take 1 tablet by mouth daily.      B Complex Vitamins (B COMPLEX OR) Take  by mouth daily.      Cholecalciferol (VITAMIN D) 1000 UNITS Oral Cap Take 5,000 Units by mouth daily.          Past Medical History:    ASCUS (atypical squamous cells of undetermined significance) on Pap smear    Cancer (HCC)    breast cancer - right    Menopausal symptoms    Murmur, cardiac    function heart murmur - childhood Rhumatic fever - no RX    PONV (postoperative nausea and vomiting)      Social History:  Social History     Socioeconomic History    Marital status:    Occupational History    Occupation:    Tobacco Use    Smoking status: Never    Smokeless tobacco: Never    Tobacco comments:     Non-smoker   Vaping Use    Vaping status: Never Used   Substance and Sexual Activity    Alcohol use: No     Alcohol/week: 0.0 standard drinks of alcohol     Comment: occ    Drug use: No     Comment: No HIV high risk behavior   Other Topics Concern    Caffeine Concern Yes     Comment: 1 drink/day    Exercise Yes     Comment: 3 times/week  Walks    Seat Belt Yes      Comment: 100%     Social Drivers of Health      Received from [facility]    Housing Stability        REVIEW OF SYSTEMS:   GENERAL HEALTH: feels well otherwise  SKIN: denies any unusual skin lesions or rashes  RESPIRATORY: denies shortness of breath with exertion  CARDIOVASCULAR: denies chest pain on exertion  GI: denies abdominal pain and denies heartburn  NEURO: denies headaches    EXAM:   /66   Pulse 82   Temp 98.2 °F (36.8 °C) (Temporal)   Resp 16   Wt 171 lb 4 oz (77.7 kg)   SpO2 98%   BMI 26.62 kg/m²   GENERAL: well developed, well nourished,in no apparent distress  SKIN: no rashes,no suspicious lesions  HEENT: atraumatic, normocephalic,ears and throat are clear  NECK: supple,no adenopathy,no bruits  LUNGS: clear to auscultation  CARDIO: RRR without murmur  GI: good BS's,no masses, HSM or tenderness  EXTREMITIES: no cyanosis, clubbing or edema  M/S: crepitous (chunky) of the left knee with flexion and extension. No instability, no pain with movement, can bear q00 % of weight on the left. Tender slightly to palpation of the head of the peroneus longus  ASSESSMENT AND PLAN:     Encounter Diagnosis   Name Primary?    Acute pain of left knee Yes   Muscle contusion, likely coupled with stretching of the medial co-lateral ligament; takes 6 weeks to heal.  Needs gentle movement and rest until pain dissipates. No suspicion of fracture, all soft tissue injury.  Discussed PT later to strengthen hamstrings and quads.     No orders of the defined types were placed in this encounter.      Meds & Refills for this Visit:  Requested Prescriptions      No prescriptions requested or ordered in this encounter       Imaging & Consults:  None    Follow up as needed.   The patient indicates understanding of these issues and agrees to the plan.

## 2025-04-29 ENCOUNTER — OFFICE VISIT (OUTPATIENT)
Dept: FAMILY MEDICINE CLINIC | Facility: CLINIC | Age: 60
End: 2025-04-29
Payer: COMMERCIAL

## 2025-04-29 VITALS
HEART RATE: 67 BPM | DIASTOLIC BLOOD PRESSURE: 69 MMHG | HEIGHT: 67 IN | BODY MASS INDEX: 26.37 KG/M2 | SYSTOLIC BLOOD PRESSURE: 136 MMHG | OXYGEN SATURATION: 98 % | RESPIRATION RATE: 18 BRPM | TEMPERATURE: 98 F | WEIGHT: 168 LBS

## 2025-04-29 DIAGNOSIS — H10.9 CONJUNCTIVITIS OF BOTH EYES, UNSPECIFIED CONJUNCTIVITIS TYPE: Primary | ICD-10-CM

## 2025-04-29 PROCEDURE — 3075F SYST BP GE 130 - 139MM HG: CPT | Performed by: PHYSICIAN ASSISTANT

## 2025-04-29 PROCEDURE — 3008F BODY MASS INDEX DOCD: CPT | Performed by: PHYSICIAN ASSISTANT

## 2025-04-29 PROCEDURE — 99213 OFFICE O/P EST LOW 20 MIN: CPT | Performed by: PHYSICIAN ASSISTANT

## 2025-04-29 PROCEDURE — 3078F DIAST BP <80 MM HG: CPT | Performed by: PHYSICIAN ASSISTANT

## 2025-04-29 RX ORDER — POLYMYXIN B SULFATE AND TRIMETHOPRIM 1; 10000 MG/ML; [USP'U]/ML
SOLUTION OPHTHALMIC
Qty: 10 ML | Refills: 0 | Status: SHIPPED | OUTPATIENT
Start: 2025-04-29 | End: 2025-05-09

## 2025-04-29 NOTE — PROGRESS NOTES
CHIEF COMPLAINT:     Chief Complaint   Patient presents with    Eye Problem     Dry and sticky eyes. Symptoms for two weeks        HPI:   Rafael Frazier is a 59 year old female who presents with chief complaint of eye irritation. Symptoms began appx 1-2 weeks ago, progressively worsening.  (+) redness, white/ropy discharge.  Feel \"sticky\" and \"gritty.\"   Exposed to pink eye last week.  H/o dry eye, on restasis, reports compliance with bid dosing \"could be better.\"  Denies vision changes, no pain with EOMI, no URI sx, no FB/irritant exposure.   Followed by [facility].     Current Medications[1]   Past Medical History[2]   Past Surgical History[3]   Family History[4]   Short Social Hx on File[5]      REVIEW OF SYSTEMS:   GENERAL: feels well otherwise  SKIN: no rashes  EYES:  See HPI  HENT: denies ear pain, congestion, sore throat  LUNGS: denies shortness of breath or cough  CARDIOVASCULAR: denies chest pain or palpitations   GI: denies N/V/C or abdominal pain    EXAM:   /69 (BP Location: Left arm, Patient Position: Sitting, Cuff Size: adult)   Pulse 67   Temp 97.7 °F (36.5 °C) (Temporal)   Resp 18   Ht 5' 7\" (1.702 m)   Wt 168 lb (76.2 kg)   LMP 06/23/2014 (Exact Date)   SpO2 98%   BMI 26.31 kg/m²   Visual Acuity     Vision Screen Test Type: Snellen Wall Chart    Right Eye Visual Acuity: Corrected Right Eye Chart Acuity: 20/80   Left Eye Visual Acuity: Corrected Left Eye Chart Acuity: 20/80   Both Eyes Visual Acuity: Corrected Both Eyes Chart Acuity: 20/80       GENERAL: well developed, well nourished,in no apparent distress  SKIN: no rashes,no suspicious lesions  EYES: PERRLA, EOMI, mild injection luis e bulbar and palpebral conjunctiva, clear corneas,mild ptosis without proptosis bilaterally (pt reports chronic), no photophobia, no periorbital edema/erythema.     LYMPH: no preauricular lymphadenopathy. No cervical lymphadenopathy    ASSESSMENT AND PLAN:   Rafael Frazier is a 59 year old  female who presents with:    ASSESSMENT:   No diagnosis found.    PLAN: M   Discussed suspect sx more related to h/o dry eye and noncomplaince with bid dosing of lubricant.  However, given exposure last week with reported worsening sx will cover with polytrim per epic.  Discussed avoidance of eye makeup, bid compliance with Restasis (uses single use vials), and close f/u with ophthlamology given she is established with [facility].      Requested Prescriptions     Signed Prescriptions Disp Refills    polymyxin B-trimethoprim 30001-1.1 UNIT/ML-% Ophthalmic Solution 10 mL 0     Si-2 gtt in affected eye(s) q 4h x 5-7 days.       Risks, benefits, complications and side effects of meds discussed.    See PCP or ophthalmologist if not improved in 2-3 days.    There are no Patient Instructions on file for this visit.           [1]   Current Outpatient Medications   Medication Sig Dispense Refill    polymyxin B-trimethoprim 20580-6.1 UNIT/ML-% Ophthalmic Solution 1-2 gtt in affected eye(s) q 4h x 5-7 days. 10 mL 0    vilazodone 20 MG Oral Tab Take 1 tablet (20 mg total) by mouth daily.      Magnesium-Potassium 250-100 MG Oral Tab Take by mouth daily.      omega-3 fatty acids 1000 MG Oral Cap Take by mouth daily.      Multiple Vitamins-Minerals (MULTI-VITAMIN/MINERALS) Oral Tab Take 1 tablet by mouth daily.      B Complex Vitamins (B COMPLEX OR) Take  by mouth daily.      Cholecalciferol (VITAMIN D) 1000 UNITS Oral Cap Take 5,000 Units by mouth daily.       [2]   Past Medical History:   ASCUS (atypical squamous cells of undetermined significance) on Pap smear    Cancer (HCC)    breast cancer - right    Menopausal symptoms    Murmur, cardiac    function heart murmur - childhood Rhumatic fever - no RX    PONV (postoperative nausea and vomiting)   [3]   Past Surgical History:  Procedure Laterality Date    Breast biopsy      Mastectomy left  14    Mastectomy right  14    Oophorectomy      Orthopedic surg  (pbp)      tendon transplant right knee    Tonsillectomy     [4]   Family History  Problem Relation Age of Onset    Allergies Mother     Hypertension Mother     High Cholesterol Mother     Gastro-Intestinal Disorder Mother         GERD    Hypertension Father     Arthritis Father         On father's side    Other (Parkinson's disease) Father     Other (Carotid Artery DS) Father    [5]   Social History  Socioeconomic History    Marital status:    Occupational History    Occupation:    Tobacco Use    Smoking status: Never    Smokeless tobacco: Never    Tobacco comments:     Non-smoker   Vaping Use    Vaping status: Never Used   Substance and Sexual Activity    Alcohol use: No     Alcohol/week: 0.0 standard drinks of alcohol     Comment: occ    Drug use: No     Comment: No HIV high risk behavior   Other Topics Concern    Caffeine Concern Yes     Comment: 1 drink/day    Exercise Yes     Comment: 3 times/week  Walks    Seat Belt Yes     Comment: 100%     Social Drivers of Health      Received from [facility]    Housing Stability

## 2025-08-15 ENCOUNTER — LABORATORY ENCOUNTER (OUTPATIENT)
Dept: LAB | Age: 60
End: 2025-08-15
Attending: FAMILY MEDICINE

## 2025-08-15 ENCOUNTER — OFFICE VISIT (OUTPATIENT)
Dept: FAMILY MEDICINE CLINIC | Facility: CLINIC | Age: 60
End: 2025-08-15

## 2025-08-15 VITALS
HEIGHT: 67 IN | DIASTOLIC BLOOD PRESSURE: 70 MMHG | WEIGHT: 169 LBS | HEART RATE: 75 BPM | SYSTOLIC BLOOD PRESSURE: 118 MMHG | BODY MASS INDEX: 26.53 KG/M2 | OXYGEN SATURATION: 98 % | RESPIRATION RATE: 19 BRPM | TEMPERATURE: 98 F

## 2025-08-15 DIAGNOSIS — Z00.00 ROUTINE ADULT HEALTH MAINTENANCE: Primary | ICD-10-CM

## 2025-08-15 DIAGNOSIS — Z00.00 ROUTINE ADULT HEALTH MAINTENANCE: ICD-10-CM

## 2025-08-15 DIAGNOSIS — F32.A ANXIETY AND DEPRESSION: ICD-10-CM

## 2025-08-15 DIAGNOSIS — F41.9 ANXIETY AND DEPRESSION: ICD-10-CM

## 2025-08-15 DIAGNOSIS — Z12.11 COLON CANCER SCREENING: ICD-10-CM

## 2025-08-15 DIAGNOSIS — H25.9 AGE-RELATED CATARACT OF BOTH EYES, UNSPECIFIED AGE-RELATED CATARACT TYPE: ICD-10-CM

## 2025-08-15 DIAGNOSIS — M85.88 OSTEOPENIA OF LUMBAR SPINE: ICD-10-CM

## 2025-08-15 DIAGNOSIS — M17.2 POST-TRAUMATIC OSTEOARTHRITIS OF BOTH KNEES: ICD-10-CM

## 2025-08-15 DIAGNOSIS — R53.83 OTHER FATIGUE: ICD-10-CM

## 2025-08-15 DIAGNOSIS — M72.2 BILATERAL PLANTAR FASCIITIS: ICD-10-CM

## 2025-08-15 DIAGNOSIS — Z91.018 MULTIPLE FOOD ALLERGIES: ICD-10-CM

## 2025-08-15 DIAGNOSIS — Z78.0 POSTMENOPAUSAL ESTROGEN DEFICIENCY: ICD-10-CM

## 2025-08-15 DIAGNOSIS — M77.12 LATERAL EPICONDYLITIS OF BOTH ELBOWS: ICD-10-CM

## 2025-08-15 DIAGNOSIS — M77.11 LATERAL EPICONDYLITIS OF BOTH ELBOWS: ICD-10-CM

## 2025-08-15 DIAGNOSIS — Z85.3 HX: BREAST CANCER: ICD-10-CM

## 2025-08-15 DIAGNOSIS — Z86.32 HX GESTATIONAL DIABETES: ICD-10-CM

## 2025-08-15 LAB
ALBUMIN SERPL-MCNC: 4.8 G/DL (ref 3.2–4.8)
ALBUMIN/GLOB SERPL: 1.9 (ref 1–2)
ALP LIVER SERPL-CCNC: 70 U/L (ref 50–130)
ALT SERPL-CCNC: 14 U/L (ref 10–49)
ANION GAP SERPL CALC-SCNC: 13 MMOL/L (ref 0–18)
AST SERPL-CCNC: 17 U/L (ref ?–34)
BASOPHILS # BLD AUTO: 0.03 X10(3) UL (ref 0–0.2)
BASOPHILS NFR BLD AUTO: 0.7 %
BILIRUB SERPL-MCNC: 0.7 MG/DL (ref 0.2–1.1)
BUN BLD-MCNC: 13 MG/DL (ref 9–23)
CALCIUM BLD-MCNC: 10 MG/DL (ref 8.7–10.6)
CHLORIDE SERPL-SCNC: 104 MMOL/L (ref 98–112)
CHOLEST SERPL-MCNC: 232 MG/DL (ref ?–200)
CO2 SERPL-SCNC: 26 MMOL/L (ref 21–32)
CREAT BLD-MCNC: 0.85 MG/DL (ref 0.55–1.02)
EGFRCR SERPLBLD CKD-EPI 2021: 77 ML/MIN/1.73M2 (ref 60–?)
EOSINOPHIL # BLD AUTO: 0.21 X10(3) UL (ref 0–0.7)
EOSINOPHIL NFR BLD AUTO: 4.7 %
ERYTHROCYTE [DISTWIDTH] IN BLOOD BY AUTOMATED COUNT: 13.2 %
EST. AVERAGE GLUCOSE BLD GHB EST-MCNC: 126 MG/DL (ref 68–126)
FASTING PATIENT LIPID ANSWER: YES
FASTING STATUS PATIENT QL REPORTED: YES
GLOBULIN PLAS-MCNC: 2.5 G/DL (ref 2–3.5)
GLUCOSE BLD-MCNC: 100 MG/DL (ref 70–99)
HBA1C MFR BLD: 6 % (ref ?–5.7)
HCT VFR BLD AUTO: 42.3 % (ref 35–48)
HDLC SERPL-MCNC: 71 MG/DL (ref 40–59)
HGB BLD-MCNC: 13.6 G/DL (ref 12–16)
IMM GRANULOCYTES # BLD AUTO: 0.01 X10(3) UL (ref 0–1)
IMM GRANULOCYTES NFR BLD: 0.2 %
LDLC SERPL CALC-MCNC: 141 MG/DL (ref ?–100)
LYMPHOCYTES # BLD AUTO: 1.62 X10(3) UL (ref 1–4)
LYMPHOCYTES NFR BLD AUTO: 36.2 %
MCH RBC QN AUTO: 27.7 PG (ref 26–34)
MCHC RBC AUTO-ENTMCNC: 32.2 G/DL (ref 31–37)
MCV RBC AUTO: 86.2 FL (ref 80–100)
MONOCYTES # BLD AUTO: 0.33 X10(3) UL (ref 0.1–1)
MONOCYTES NFR BLD AUTO: 7.4 %
NEUTROPHILS # BLD AUTO: 2.27 X10 (3) UL (ref 1.5–7.7)
NEUTROPHILS # BLD AUTO: 2.27 X10(3) UL (ref 1.5–7.7)
NEUTROPHILS NFR BLD AUTO: 50.8 %
NONHDLC SERPL-MCNC: 161 MG/DL (ref ?–130)
OSMOLALITY SERPL CALC.SUM OF ELEC: 296 MOSM/KG (ref 275–295)
PLATELET # BLD AUTO: 243 10(3)UL (ref 150–450)
POTASSIUM SERPL-SCNC: 4.1 MMOL/L (ref 3.5–5.1)
PROT SERPL-MCNC: 7.3 G/DL (ref 5.7–8.2)
RBC # BLD AUTO: 4.91 X10(6)UL (ref 3.8–5.3)
SODIUM SERPL-SCNC: 143 MMOL/L (ref 136–145)
TRIGL SERPL-MCNC: 117 MG/DL (ref 30–149)
TSI SER-ACNC: 2.18 UIU/ML (ref 0.55–4.78)
VIT B12 SERPL-MCNC: 384 PG/ML (ref 211–911)
VIT D+METAB SERPL-MCNC: 23.8 NG/ML (ref 30–100)
VLDLC SERPL CALC-MCNC: 22 MG/DL (ref 0–30)
WBC # BLD AUTO: 4.5 X10(3) UL (ref 4–11)

## 2025-08-15 PROCEDURE — 80050 GENERAL HEALTH PANEL: CPT | Performed by: FAMILY MEDICINE

## 2025-08-15 PROCEDURE — 82306 VITAMIN D 25 HYDROXY: CPT | Performed by: FAMILY MEDICINE

## 2025-08-15 PROCEDURE — 83655 ASSAY OF LEAD: CPT | Performed by: FAMILY MEDICINE

## 2025-08-15 PROCEDURE — 83036 HEMOGLOBIN GLYCOSYLATED A1C: CPT | Performed by: FAMILY MEDICINE

## 2025-08-15 PROCEDURE — 83825 ASSAY OF MERCURY: CPT | Performed by: FAMILY MEDICINE

## 2025-08-15 PROCEDURE — 80061 LIPID PANEL: CPT | Performed by: FAMILY MEDICINE

## 2025-08-15 PROCEDURE — 82607 VITAMIN B-12: CPT | Performed by: FAMILY MEDICINE

## 2025-08-15 RX ORDER — MELOXICAM 15 MG/1
15 TABLET ORAL DAILY
Qty: 90 TABLET | Refills: 0 | Status: SHIPPED | OUTPATIENT
Start: 2025-08-15

## 2025-08-15 RX ORDER — BUPROPION HYDROCHLORIDE 150 MG/1
150 TABLET ORAL DAILY
COMMUNITY
Start: 2025-07-19 | End: 2025-08-15 | Stop reason: ALTCHOICE

## 2025-08-18 LAB — LEAD BLOOD ADULT: <1 UG/DL

## 2025-08-20 LAB — MERCURY BLOOD: <1 UG/L

## (undated) NOTE — Clinical Note
I had the pleasure of seeing Tamir Perez on 3/23/2021. Please see my attached note.     Zeeshan Granados MD FACS  [facility]

## (undated) NOTE — LETTER
AUTHORIZATION FOR SURGICAL OPERATION OR OTHER PROCEDURE    1. I hereby authorize Dr. Reggie Storey, and [facility] assigned to my case to perform the following operation and/or procedure at the [facility]:    _______________________________________________________________________________________________    Cortisone Injection Right Heel  _______________________________________________________________________________________________    2. My physician has explained the nature and purpose of the operation or other procedure, possible alternative methods of treatment, the risks involved, and the possibility of complication to me. I acknowledge that no guarantee has been made as to the result that may be obtained. 3.  I recognize that, during the course of this operation, or other procedure, unforseen conditions may necessitate additional or different procedure than those listed above. I, therefore, further authorize and request that the above named physician, his/her physician assistants or designees perform such procedures as are, in his/her professional opinion, necessary and desirable. 4.  Any tissue or organs removed in the operation or other procedure may be disposed of by and at the discretion of the [facility] and [facility] AT [facility]. 5.  I understand that in the event of a medical emergency, I will be transported by local paramedics to [facility] or other hospital emergency department. 6.  I certify that I have read and fully understand the above consent to operation and/or other procedure. 7.  I acknowledge that my physician has explained sedation/analgesia administration to me including the risks and benefits. I consent to the administration of sedation/analgesia as may be necessary or desirable in the judgement of my physician.     Witness signature: ___________________________________________________ Date:  ______/______/_____ Time:  ________ A. M.  P.M. Patient Name:  ______________________________________________________  (please print)      Patient signature:  ___________________________________________________             Relationship to Patient:           []  Parent    Responsible person                          []  Spouse  In case of minor or                    [] Other  _____________   Incompetent name:  __________________________________________________                               (please print)      _____________      Responsible person  In case of minor or  Incompetent signature:  _______________________________________________    Statement of Physician  My signature below affirms that prior to the time of the procedure, I have explained to the patient and/or his/her guardian, the risks and benefits involved in the proposed treatment and any reasonable alternative to the proposed treatment. I have also explained the risks and benefits involved in the refusal of the proposed treatment and have answered the patient's questions.                         Date:  ______/______/_______  Provider                      Signature:  __________________________________________________________       Time:  ___________ A.M    P.M.

## (undated) NOTE — MR AVS SNAPSHOT
[facility]  [address] [address]  [phone]               Thank you for choosing us for your health care visit with Adi Garcia DO.   We are glad to serve you and happy to provide you with this summary of BP Pulse Temp Height Weight BMI    106/66 mmHg 91 98.2 °F (36.8 °C) (Tympanic) 69\" 145 lb 6 oz 21.46 kg/m2         Current Medications          This list is accurate as of: 3/31/17  1:27 PM.  Always use your most recent med list.                [employer]